# Patient Record
Sex: MALE | Race: WHITE | NOT HISPANIC OR LATINO | Employment: FULL TIME | URBAN - NONMETROPOLITAN AREA
[De-identification: names, ages, dates, MRNs, and addresses within clinical notes are randomized per-mention and may not be internally consistent; named-entity substitution may affect disease eponyms.]

---

## 2021-04-01 ENCOUNTER — HOSPITAL ENCOUNTER (OUTPATIENT)
Dept: GENERAL RADIOLOGY | Facility: HOSPITAL | Age: 41
Discharge: HOME | End: 2021-04-01
Admitting: NURSE PRACTITIONER

## 2021-04-01 ENCOUNTER — TRANSCRIBE ORDERS (OUTPATIENT)
Dept: LAB | Facility: HOSPITAL | Age: 41
End: 2021-04-01

## 2021-04-01 DIAGNOSIS — R31.9 HEMATURIA, UNSPECIFIED TYPE: ICD-10-CM

## 2021-04-01 DIAGNOSIS — R30.0 DYSURIA: ICD-10-CM

## 2021-04-01 DIAGNOSIS — R30.0 DYSURIA: Primary | ICD-10-CM

## 2021-04-01 PROCEDURE — 74019 RADEX ABDOMEN 2 VIEWS: CPT | Performed by: RADIOLOGY

## 2021-04-01 PROCEDURE — 74018 RADEX ABDOMEN 1 VIEW: CPT

## 2022-04-11 ENCOUNTER — HOSPITAL ENCOUNTER (OUTPATIENT)
Dept: GENERAL RADIOLOGY | Facility: HOSPITAL | Age: 42
Discharge: HOME | End: 2022-04-11
Admitting: NURSE PRACTITIONER

## 2022-04-11 ENCOUNTER — OFFICE VISIT (OUTPATIENT)
Dept: UROLOGY | Facility: CLINIC | Age: 42
End: 2022-04-11

## 2022-04-11 VITALS — WEIGHT: 185 LBS | HEIGHT: 66 IN | BODY MASS INDEX: 29.73 KG/M2

## 2022-04-11 DIAGNOSIS — K59.04 CHRONIC IDIOPATHIC CONSTIPATION: ICD-10-CM

## 2022-04-11 DIAGNOSIS — R10.9 BILATERAL FLANK PAIN: ICD-10-CM

## 2022-04-11 DIAGNOSIS — N20.1 LEFT URETERAL STONE: Primary | ICD-10-CM

## 2022-04-11 DIAGNOSIS — N20.0 BILATERAL NEPHROLITHIASIS: ICD-10-CM

## 2022-04-11 LAB
BILIRUB BLD-MCNC: ABNORMAL MG/DL
CLARITY, POC: CLEAR
COLOR UR: YELLOW
EXPIRATION DATE: ABNORMAL
GLUCOSE UR STRIP-MCNC: NEGATIVE MG/DL
KETONES UR QL: NEGATIVE
LEUKOCYTE EST, POC: NEGATIVE
Lab: ABNORMAL
NITRITE UR-MCNC: NEGATIVE MG/ML
PH UR: 5 [PH] (ref 5–8)
PROT UR STRIP-MCNC: ABNORMAL MG/DL
RBC # UR STRIP: ABNORMAL /UL
SP GR UR: 1.02 (ref 1–1.03)
UROBILINOGEN UR QL: NORMAL

## 2022-04-11 PROCEDURE — 74018 RADEX ABDOMEN 1 VIEW: CPT | Performed by: RADIOLOGY

## 2022-04-11 PROCEDURE — 74018 RADEX ABDOMEN 1 VIEW: CPT

## 2022-04-11 PROCEDURE — 99204 OFFICE O/P NEW MOD 45 MIN: CPT | Performed by: NURSE PRACTITIONER

## 2022-04-11 PROCEDURE — 87086 URINE CULTURE/COLONY COUNT: CPT | Performed by: NURSE PRACTITIONER

## 2022-04-11 PROCEDURE — 96372 THER/PROPH/DIAG INJ SC/IM: CPT | Performed by: NURSE PRACTITIONER

## 2022-04-11 RX ORDER — POLYETHYLENE GLYCOL 3350 17 G/17G
17 POWDER, FOR SOLUTION ORAL DAILY
Qty: 30 EACH | Refills: 3 | Status: SHIPPED | OUTPATIENT
Start: 2022-04-11

## 2022-04-11 RX ORDER — KETOROLAC TROMETHAMINE 30 MG/ML
60 INJECTION, SOLUTION INTRAMUSCULAR; INTRAVENOUS ONCE
Status: COMPLETED | OUTPATIENT
Start: 2022-04-11 | End: 2022-04-11

## 2022-04-11 RX ORDER — KETOROLAC TROMETHAMINE 10 MG/1
10 TABLET, FILM COATED ORAL 3 TIMES DAILY
COMMUNITY
Start: 2022-04-07 | End: 2022-04-12

## 2022-04-11 RX ORDER — TAMSULOSIN HYDROCHLORIDE 0.4 MG/1
1 CAPSULE ORAL DAILY
Qty: 30 CAPSULE | Refills: 1 | Status: SHIPPED | OUTPATIENT
Start: 2022-04-11

## 2022-04-11 RX ORDER — TAMSULOSIN HYDROCHLORIDE 0.4 MG/1
0.4 CAPSULE ORAL
COMMUNITY
Start: 2015-09-27 | End: 2022-04-20

## 2022-04-11 RX ORDER — HYDROCODONE BITARTRATE AND ACETAMINOPHEN 5; 325 MG/1; MG/1
1 TABLET ORAL EVERY 6 HOURS PRN
COMMUNITY
Start: 2022-04-06

## 2022-04-11 RX ORDER — KETOROLAC TROMETHAMINE 10 MG/1
10 TABLET, FILM COATED ORAL EVERY 6 HOURS PRN
Qty: 16 TABLET | Refills: 0 | Status: SHIPPED | OUTPATIENT
Start: 2022-04-11

## 2022-04-11 RX ORDER — GENTAMICIN SULFATE 80 MG/100ML
80 INJECTION, SOLUTION INTRAVENOUS ONCE
Status: CANCELLED | OUTPATIENT
Start: 2022-04-11

## 2022-04-11 RX ORDER — ONDANSETRON 4 MG/1
4 TABLET, FILM COATED ORAL EVERY 12 HOURS PRN
Qty: 20 TABLET | Refills: 0 | Status: SHIPPED | OUTPATIENT
Start: 2022-04-11

## 2022-04-11 RX ADMIN — KETOROLAC TROMETHAMINE 60 MG: 30 INJECTION, SOLUTION INTRAMUSCULAR; INTRAVENOUS at 10:21

## 2022-04-11 NOTE — H&P (VIEW-ONLY)
Chief Complaint  LEFT PROXIMAL URETERAL CALCULUS/FLANK PAIN (NEW PATIENT WITH LEFT PROXIMAL URETERAL STONE/FLANK PAIN ER FOLLOW UP)    Franc Mendoza presents to [facility] GASTROENTEROLOGY [facility] UROLOGY  History of Present Illness    MR. KISHOR MENDOZA IS A Very pleasant 41 YEAR OLD MALE patient with a significant history of recurrent kidney stones, WHO presents to clinic today for evaluation. He is an ER follow-up for kidney stones.  He e was last seen in clinic in 2016 by Dr. Kuhn, he reports passing numerous stones all the time.    Patient states he presented to [facility] in [place]  ED morning 04/06/2022 secondary to very sharp 10/10, aching and very consistent left flank pain radiating to his LLQ of his abdomen and suprapubic region.  He states he had significant testalgia, pelvic pain and suprapubic discomfort, with urinary symptoms of frequency, urgency, and dysuria.    He had a CT abdomen which showed an obstructing 5mm calculus in the left proximal ureter, causing moderate left sided hydronephrosis and hydroureter.  There were no other stones or hydronephrosis in bilateral kidneys, or right distal ureter.    On presentation to clinic, he reports significant discomfort 8/10.  He reports left flank pain that is still very colicky. He has left lower quadrant pain, significant pressure/discomfort still in his leftt lower abdomen and pelvic region. He still has nausea, no vomiting, denies chills or fevers. He denies having any more urinary symptoms of frequency, urgency, and dysuria.  He denies any burning on urination, his IPSS score is 13.    We both reviewed/discussed his repeat KUB today which showed that    We both reviewed/discussed her KUB results today which are remarkable for LEFT 5MM Obstructing proximal ureteral calcifications, and moderate to large stool burden.  THIS IS characterized by extreme amounts of constipation.  We spoke about the impact of this  "on bladder function.  We spoke about  its relationship to recurrent urinary tract infections, bladder pain, abdominal pain, and flank pain she is experiencing..        Objective   Vital Signs:   Ht 167.6 cm (66\")   Wt 83.9 kg (185 lb)   BMI 29.86 kg/m²     BMI has not been calculated during today's encounter.       Physical Exam  Constitutional:       General: He is in acute distress.      Appearance: He is well-developed.   HENT:      Head: Normocephalic and atraumatic.      Right Ear: External ear normal.      Left Ear: External ear normal.   Eyes:      General:         Right eye: No discharge.         Left eye: No discharge.      Conjunctiva/sclera: Conjunctivae normal.      Pupils: Pupils are equal, round, and reactive to light.   Neck:      Thyroid: No thyromegaly.      Trachea: No tracheal deviation.   Cardiovascular:      Rate and Rhythm: Normal rate and regular rhythm.      Heart sounds: No murmur heard.    No friction rub.   Pulmonary:      Effort: Pulmonary effort is normal. No respiratory distress.      Breath sounds: Normal breath sounds. No stridor.   Abdominal:      General: Bowel sounds are normal. There is distension.      Palpations: Abdomen is soft.      Tenderness: There is abdominal tenderness. There is left CVA tenderness and guarding.   Genitourinary:     Penis: Normal and uncircumcised. No tenderness or discharge.       Testes: Normal.      Rectum: Normal. Guaiac result negative.   Musculoskeletal:         General: Tenderness present. No deformity. Normal range of motion.      Cervical back: Normal range of motion and neck supple.   Skin:     General: Skin is warm and dry.      Capillary Refill: Capillary refill takes less than 2 seconds.      Coloration: Skin is pale.   Neurological:      Mental Status: He is alert and oriented to person, place, and time.      Cranial Nerves: No cranial nerve deficit.      Motor: Weakness present.      Coordination: Coordination normal.   Psychiatric:       "   Behavior: Behavior normal.         Thought Content: Thought content normal.         Judgment: Judgment normal.     IPSS Questionnaire (AUA-7):  Over the past month…    1)  How often have you had a sensation of not emptying your bladder completely after you finish urinating?  2 - Less than half the time   2)  How often have you had to urinate again less than two hours after you finished urinating? 3 - About half the time   3)  How often have you found you stopped and started again several times when you urinated?  1 - Less than 1 time in 5   4) How difficult have you found it to postpone urination?  3 - About half the time   5) How often have you had a weak urinary stream?  1 - Less than 1 time in 5   6) How often have you had to push or strain to begin urination?  1 - Less than 1 time in 5   7) How many times did you most typically get up to urinate from the time you went to bed until the time you got up in the morning?  2 - 2 times   Total score:  0-7 mildly symptomatic    8-19 moderately symptomatic                                       13    20-35 severely symptomatic         Result Review :     UA    Urinalysis 4/11/22   Ketones, UA Negative   Leukocytes, UA Negative           Data reviewed: Radiologic studies Reviewed CT scan done at [facility] 04/06/2022, and KUB done today were remarkable for a 5 mm left ureteral calculus.          Assessment and Plan    Diagnoses and all orders for this visit:    1. Left ureteral stone (Primary)  -     COVID PRE-OP / PRE-PROCEDURE SCREENING ORDER (NO ISOLATION) - Swab, Nasopharynx; Future  -     Case Request; Standing  -     CBC (No Diff); Future  -     Basic Metabolic Panel; Future  -     Case Request  -     ketorolac (TORADOL) 10 MG tablet; Take 1 tablet by mouth Every 6 (Six) Hours As Needed for Moderate Pain .  Dispense: 16 tablet; Refill: 0  -     tamsulosin (FLOMAX) 0.4 MG capsule 24 hr capsule; Take 1 capsule by mouth Daily.  Dispense: 30 capsule; Refill:  1  -     ondansetron (Zofran) 4 MG tablet; Take 1 tablet by mouth Every 12 (Twelve) Hours As Needed for Nausea.  Dispense: 20 tablet; Refill: 0  -     POC Urinalysis Dipstick, Automated  -     Urine Culture - Urine, Urine, Clean Catch  -     ketorolac (TORADOL) injection 60 mg    2. Bilateral nephrolithiasis  -     XR Abdomen KUB  -     ketorolac (TORADOL) 10 MG tablet; Take 1 tablet by mouth Every 6 (Six) Hours As Needed for Moderate Pain .  Dispense: 16 tablet; Refill: 0  -     tamsulosin (FLOMAX) 0.4 MG capsule 24 hr capsule; Take 1 capsule by mouth Daily.  Dispense: 30 capsule; Refill: 1  -     ondansetron (Zofran) 4 MG tablet; Take 1 tablet by mouth Every 12 (Twelve) Hours As Needed for Nausea.  Dispense: 20 tablet; Refill: 0  -     POC Urinalysis Dipstick, Automated  -     Urine Culture - Urine, Urine, Clean Catch    3. Bilateral flank pain  -     XR Abdomen KUB  -     COVID PRE-OP / PRE-PROCEDURE SCREENING ORDER (NO ISOLATION) - Swab, Nasopharynx; Future  -     Case Request; Standing  -     CBC (No Diff); Future  -     Basic Metabolic Panel; Future  -     Case Request  -     ketorolac (TORADOL) 10 MG tablet; Take 1 tablet by mouth Every 6 (Six) Hours As Needed for Moderate Pain .  Dispense: 16 tablet; Refill: 0  -     tamsulosin (FLOMAX) 0.4 MG capsule 24 hr capsule; Take 1 capsule by mouth Daily.  Dispense: 30 capsule; Refill: 1  -     ondansetron (Zofran) 4 MG tablet; Take 1 tablet by mouth Every 12 (Twelve) Hours As Needed for Nausea.  Dispense: 20 tablet; Refill: 0  -     POC Urinalysis Dipstick, Automated  -     Urine Culture - Urine, Urine, Clean Catch  -     ketorolac (TORADOL) injection 60 mg    4. Chronic idiopathic constipation  -     XR Abdomen KUB  -     ketorolac (TORADOL) 10 MG tablet; Take 1 tablet by mouth Every 6 (Six) Hours As Needed for Moderate Pain .  Dispense: 16 tablet; Refill: 0  -     tamsulosin (FLOMAX) 0.4 MG capsule 24 hr capsule; Take 1 capsule by mouth Daily.  Dispense: 30  capsule; Refill: 1  -     ondansetron (Zofran) 4 MG tablet; Take 1 tablet by mouth Every 12 (Twelve) Hours As Needed for Nausea.  Dispense: 20 tablet; Refill: 0  -     magnesium citrate solution; Take 296 mL by mouth 1 (One) Time for 1 dose. MAY REPEAT IN 0NE WEEK IF NOT EFFECTIVE  Dispense: 296 mL; Refill: 3  -     polyethylene glycol (MiraLax) 17 g packet; Take 17 g by mouth Daily.  Dispense: 30 each; Refill: 3    Other orders  -     Follow Anesthesia Guidelines / Protocol; Future  -     Obtain Informed Consent; Future  -     Provide NPO Instructions to Patient; Future  -     Chlorhexidine Skin Prep; Future       ASSESSMENT   Left Ureteral Calculus/FLANK PAIN/DYSURIA/IBS-CONSTIPATION  Mr. Alvarez Mendoza is a pleasant 41-year-old male with a significant history of recurrent kidney stones who presents to clinic today for evaluation.  He is an ER follow-up, with CT scan showing a left proximal 5 mm ureteral stone, and a significant moderate stool burden in his colon consistent with constipation.    The Patient has been diagnosed with a 5MM Left ureteral calculus.  He presents to clinic today with left flank pain that is colicky in nature, very persistent 7/10 and has become very bothersome to him.  He has had  ER visits in the last week, he is now desirous of surgical intervention at this point.    We have discussed the various parameters regarding spontaneous passage including the notion that a tiny 1 -3 mm stone like he has, has a 95% high likelihood of spontaneous passage versus a larger stone OF > 5 MM being caught up in the upper areas of the urinary tract. We also discussed the medical management of stone disease and the use of medical expulsive therapy in the form of Flomax. This is used in an off label setting. I also talked about nonoperative management including ambulation and increasing fluids and hot tub as being an effective adjuncts in the treatment of a ureteral stone.     We discussed the indicators for  intervention including  absolute indicators such as sepsis and uncontrollable severe pain as well as  the relative indicators of moderate pain that is well-controlled with various analgesia.     IBS- Patient has significant irritable bowel syndrome, characterized by extreme amounts of constipation.  We spoke about the impact of this on bladder function.  We spoke about  its relationship to recurrent urinary tract infections.We discussed the need for increasing p.o. fluid intake to at least 2 to 3 L of water daily, and discussed the physiology of colonic motility as well as use of MiraLAX as a bulk laxative versus the newer class of serotonin uptake blockers such as Linzess.  We stressed the need for a daily bowel movement and discussed the [place] stool scale at length. We also discussed a referral to the GI nurse practitioner        PLAN    I Discussed this case with Dr ALCALA, who also reviewed his imaging and he is agreeable this plan of care.      Patient has been scheduled for Left Ureteroscopy laser lithotripsy with possible left stent on Thursday,April 14, 2022 with Dr. ALCALA    Toradol 60 mg IM x1 dose given in clinic today-severe left flank pain    Patient have adequate Narcotic pain medication hydrocodone given in ER.    Encourage bowel regimen ASAP-severe constipation/pain    He has been given nonnarcotic pain medication-Toradol and advised to continue with Toradol for breakthrough pain as needed.  Also nausea medicine, Flomax for medical expulsive therapy     We discussed other treatment options for her flank pain pending surgery, with patient encouraged to continue conservative therapy alternating NSAID/Tylenol as tolerated,and a strict Bowel regiment,  Also including hot baths, showers, warm compresses to lower back as tolerated. Also encouraged walking, physical therapy, light exercises as tolerated    Rest is the most important treatment in the case of flank pain. Rest and physical therapy are  enough to improve minor pain. Discussed to monitor her daily routine with prevention of flank pain by: At least Drinking 8 glasses of water per day, Limiting your alcohol consumption.  Having a healthy diet containing fruits, vegetables, and a lot of fluids, Practicing safe sex.  Also maintaining proper hygiene of your body as well as the environment.    We will follow-up with patient postprocedure,     HE may return sooner if need be    Patient is agreeable to plan of care    Follow Up   Return in about 3 days (around 4/14/2022) for Next scheduled follow up, DR ALCALA, IN OR FOR CYSTOSCOPY URESTEROSCOPY LASER LITHOTRIPSY.  Patient was given instructions and counseling regarding his condition or for health maintenance advice. Please see specific information pulled into the AVS if appropriate.

## 2022-04-11 NOTE — PROGRESS NOTES
Chief Complaint  LEFT PROXIMAL URETERAL CALCULUS/FLANK PAIN (NEW PATIENT WITH LEFT PROXIMAL URETERAL STONE/FLANK PAIN ER FOLLOW UP)    Franc Mendoza presents to [facility]  History of Present Illness    MR. KISHOR MENDOZA IS A Very pleasant 41 YEAR OLD MALE patient with a significant history of recurrent kidney stones, WHO presents to clinic today for evaluation. He is an ER follow-up for kidney stones.  He e was last seen in clinic in 2016 by Dr. Kuhn, he reports passing numerous stones all the time.    Patient states he presented to [facility] in [place]  ED morning 04/06/2022 secondary to very sharp 10/10, aching and very consistent left flank pain radiating to his LLQ of his abdomen and suprapubic region.  He states he had significant testalgia, pelvic pain and suprapubic discomfort, with urinary symptoms of frequency, urgency, and dysuria.    He had a CT abdomen which showed an obstructing 5mm calculus in the left proximal ureter, causing moderate left sided hydronephrosis and hydroureter.  There were no other stones or hydronephrosis in bilateral kidneys, or right distal ureter.    On presentation to clinic, he reports significant discomfort 8/10.  He reports left flank pain that is still very colicky. He has left lower quadrant pain, significant pressure/discomfort still in his leftt lower abdomen and pelvic region. He still has nausea, no vomiting, denies chills or fevers. He denies having any more urinary symptoms of frequency, urgency, and dysuria.  He denies any burning on urination, his IPSS score is 13.    We both reviewed/discussed his repeat KUB today which showed that    We both reviewed/discussed her KUB results today which are remarkable for LEFT 5MM Obstructing proximal ureteral calcifications, and moderate to large stool burden.  THIS IS characterized by extreme amounts of constipation.  We spoke about the impact of this  "on bladder function.  We spoke about  its relationship to recurrent urinary tract infections, bladder pain, abdominal pain, and flank pain she is experiencing..        Objective   Vital Signs:   Ht 167.6 cm (66\")   Wt 83.9 kg (185 lb)   BMI 29.86 kg/m²     BMI has not been calculated during today's encounter.       Physical Exam  Constitutional:       General: He is in acute distress.      Appearance: He is well-developed.   HENT:      Head: Normocephalic and atraumatic.      Right Ear: External ear normal.      Left Ear: External ear normal.   Eyes:      General:         Right eye: No discharge.         Left eye: No discharge.      Conjunctiva/sclera: Conjunctivae normal.      Pupils: Pupils are equal, round, and reactive to light.   Neck:      Thyroid: No thyromegaly.      Trachea: No tracheal deviation.   Cardiovascular:      Rate and Rhythm: Normal rate and regular rhythm.      Heart sounds: No murmur heard.    No friction rub.   Pulmonary:      Effort: Pulmonary effort is normal. No respiratory distress.      Breath sounds: Normal breath sounds. No stridor.   Abdominal:      General: Bowel sounds are normal. There is distension.      Palpations: Abdomen is soft.      Tenderness: There is abdominal tenderness. There is left CVA tenderness and guarding.   Genitourinary:     Penis: Normal and uncircumcised. No tenderness or discharge.       Testes: Normal.      Rectum: Normal. Guaiac result negative.   Musculoskeletal:         General: Tenderness present. No deformity. Normal range of motion.      Cervical back: Normal range of motion and neck supple.   Skin:     General: Skin is warm and dry.      Capillary Refill: Capillary refill takes less than 2 seconds.      Coloration: Skin is pale.   Neurological:      Mental Status: He is alert and oriented to person, place, and time.      Cranial Nerves: No cranial nerve deficit.      Motor: Weakness present.      Coordination: Coordination normal.   Psychiatric:       "   Behavior: Behavior normal.         Thought Content: Thought content normal.         Judgment: Judgment normal.     IPSS Questionnaire (AUA-7):  Over the past month…    1)  How often have you had a sensation of not emptying your bladder completely after you finish urinating?  2 - Less than half the time   2)  How often have you had to urinate again less than two hours after you finished urinating? 3 - About half the time   3)  How often have you found you stopped and started again several times when you urinated?  1 - Less than 1 time in 5   4) How difficult have you found it to postpone urination?  3 - About half the time   5) How often have you had a weak urinary stream?  1 - Less than 1 time in 5   6) How often have you had to push or strain to begin urination?  1 - Less than 1 time in 5   7) How many times did you most typically get up to urinate from the time you went to bed until the time you got up in the morning?  2 - 2 times   Total score:  0-7 mildly symptomatic    8-19 moderately symptomatic                                       13    20-35 severely symptomatic         Result Review :     UA    Urinalysis 4/11/22   Ketones, UA Negative   Leukocytes, UA Negative           Data reviewed: Radiologic studies Reviewed CT scan done at [facility] 04/06/2022, and KUB done today were remarkable for a 5 mm left ureteral calculus.          Assessment and Plan    Diagnoses and all orders for this visit:    1. Left ureteral stone (Primary)  -     COVID PRE-OP / PRE-PROCEDURE SCREENING ORDER (NO ISOLATION) - Swab, Nasopharynx; Future  -     Case Request; Standing  -     CBC (No Diff); Future  -     Basic Metabolic Panel; Future  -     Case Request  -     ketorolac (TORADOL) 10 MG tablet; Take 1 tablet by mouth Every 6 (Six) Hours As Needed for Moderate Pain .  Dispense: 16 tablet; Refill: 0  -     tamsulosin (FLOMAX) 0.4 MG capsule 24 hr capsule; Take 1 capsule by mouth Daily.  Dispense: 30 capsule; Refill:  1  -     ondansetron (Zofran) 4 MG tablet; Take 1 tablet by mouth Every 12 (Twelve) Hours As Needed for Nausea.  Dispense: 20 tablet; Refill: 0  -     POC Urinalysis Dipstick, Automated  -     Urine Culture - Urine, Urine, Clean Catch  -     ketorolac (TORADOL) injection 60 mg    2. Bilateral nephrolithiasis  -     XR Abdomen KUB  -     ketorolac (TORADOL) 10 MG tablet; Take 1 tablet by mouth Every 6 (Six) Hours As Needed for Moderate Pain .  Dispense: 16 tablet; Refill: 0  -     tamsulosin (FLOMAX) 0.4 MG capsule 24 hr capsule; Take 1 capsule by mouth Daily.  Dispense: 30 capsule; Refill: 1  -     ondansetron (Zofran) 4 MG tablet; Take 1 tablet by mouth Every 12 (Twelve) Hours As Needed for Nausea.  Dispense: 20 tablet; Refill: 0  -     POC Urinalysis Dipstick, Automated  -     Urine Culture - Urine, Urine, Clean Catch    3. Bilateral flank pain  -     XR Abdomen KUB  -     COVID PRE-OP / PRE-PROCEDURE SCREENING ORDER (NO ISOLATION) - Swab, Nasopharynx; Future  -     Case Request; Standing  -     CBC (No Diff); Future  -     Basic Metabolic Panel; Future  -     Case Request  -     ketorolac (TORADOL) 10 MG tablet; Take 1 tablet by mouth Every 6 (Six) Hours As Needed for Moderate Pain .  Dispense: 16 tablet; Refill: 0  -     tamsulosin (FLOMAX) 0.4 MG capsule 24 hr capsule; Take 1 capsule by mouth Daily.  Dispense: 30 capsule; Refill: 1  -     ondansetron (Zofran) 4 MG tablet; Take 1 tablet by mouth Every 12 (Twelve) Hours As Needed for Nausea.  Dispense: 20 tablet; Refill: 0  -     POC Urinalysis Dipstick, Automated  -     Urine Culture - Urine, Urine, Clean Catch  -     ketorolac (TORADOL) injection 60 mg    4. Chronic idiopathic constipation  -     XR Abdomen KUB  -     ketorolac (TORADOL) 10 MG tablet; Take 1 tablet by mouth Every 6 (Six) Hours As Needed for Moderate Pain .  Dispense: 16 tablet; Refill: 0  -     tamsulosin (FLOMAX) 0.4 MG capsule 24 hr capsule; Take 1 capsule by mouth Daily.  Dispense: 30  capsule; Refill: 1  -     ondansetron (Zofran) 4 MG tablet; Take 1 tablet by mouth Every 12 (Twelve) Hours As Needed for Nausea.  Dispense: 20 tablet; Refill: 0  -     magnesium citrate solution; Take 296 mL by mouth 1 (One) Time for 1 dose. MAY REPEAT IN 0NE WEEK IF NOT EFFECTIVE  Dispense: 296 mL; Refill: 3  -     polyethylene glycol (MiraLax) 17 g packet; Take 17 g by mouth Daily.  Dispense: 30 each; Refill: 3    Other orders  -     Follow Anesthesia Guidelines / Protocol; Future  -     Obtain Informed Consent; Future  -     Provide NPO Instructions to Patient; Future  -     Chlorhexidine Skin Prep; Future       ASSESSMENT   Left Ureteral Calculus/FLANK PAIN/DYSURIA/IBS-CONSTIPATION  Mr. Alvarez Mendoza is a pleasant 41-year-old male with a significant history of recurrent kidney stones who presents to clinic today for evaluation.  He is an ER follow-up, with CT scan showing a left proximal 5 mm ureteral stone, and a significant moderate stool burden in his colon consistent with constipation.    The Patient has been diagnosed with a 5MM Left ureteral calculus.  He presents to clinic today with left flank pain that is colicky in nature, very persistent 7/10 and has become very bothersome to him.  He has had  ER visits in the last week, he is now desirous of surgical intervention at this point.    We have discussed the various parameters regarding spontaneous passage including the notion that a tiny 1 -3 mm stone like he has, has a 95% high likelihood of spontaneous passage versus a larger stone OF > 5 MM being caught up in the upper areas of the urinary tract. We also discussed the medical management of stone disease and the use of medical expulsive therapy in the form of Flomax. This is used in an off label setting. I also talked about nonoperative management including ambulation and increasing fluids and hot tub as being an effective adjuncts in the treatment of a ureteral stone.     We discussed the indicators for  intervention including  absolute indicators such as sepsis and uncontrollable severe pain as well as  the relative indicators of moderate pain that is well-controlled with various analgesia.     IBS- Patient has significant irritable bowel syndrome, characterized by extreme amounts of constipation.  We spoke about the impact of this on bladder function.  We spoke about  its relationship to recurrent urinary tract infections.We discussed the need for increasing p.o. fluid intake to at least 2 to 3 L of water daily, and discussed the physiology of colonic motility as well as use of MiraLAX as a bulk laxative versus the newer class of serotonin uptake blockers such as Linzess.  We stressed the need for a daily bowel movement and discussed the [place] stool scale at length. We also discussed a referral to the GI nurse practitioner        PLAN    I Discussed this case with Dr ALCALA, who also reviewed his imaging and he is agreeable this plan of care.      Patient has been scheduled for Left Ureteroscopy laser lithotripsy with possible left stent on Thursday,April 14, 2022 with Dr. ALCALA    Toradol 60 mg IM x1 dose given in clinic today-severe left flank pain    Patient have adequate Narcotic pain medication hydrocodone given in ER.    Encourage bowel regimen ASAP-severe constipation/pain    He has been given nonnarcotic pain medication-Toradol and advised to continue with Toradol for breakthrough pain as needed.  Also nausea medicine, Flomax for medical expulsive therapy     We discussed other treatment options for her flank pain pending surgery, with patient encouraged to continue conservative therapy alternating NSAID/Tylenol as tolerated,and a strict Bowel regiment,  Also including hot baths, showers, warm compresses to lower back as tolerated. Also encouraged walking, physical therapy, light exercises as tolerated    Rest is the most important treatment in the case of flank pain. Rest and physical therapy are  enough to improve minor pain. Discussed to monitor her daily routine with prevention of flank pain by: At least Drinking 8 glasses of water per day, Limiting your alcohol consumption.  Having a healthy diet containing fruits, vegetables, and a lot of fluids, Practicing safe sex.  Also maintaining proper hygiene of your body as well as the environment.    We will follow-up with patient postprocedure,     HE may return sooner if need be    Patient is agreeable to plan of care    Follow Up   Return in about 3 days (around 4/14/2022) for Next scheduled follow up, DR ALCALA, IN OR FOR CYSTOSCOPY URESTEROSCOPY LASER LITHOTRIPSY.  Patient was given instructions and counseling regarding his condition or for health maintenance advice. Please see specific information pulled into the AVS if appropriate.

## 2022-04-11 NOTE — DISCHARGE INSTRUCTIONS

## 2022-04-11 NOTE — PATIENT INSTRUCTIONS
"Textbook of Natural Medicine (5th ed., pp. 0183-8922.e3). [place], [place]: [employer].\">   Dietary Guidelines to Help Prevent Kidney Stones  Kidney stones are deposits of minerals and salts that form inside your kidneys. Your risk of developing kidney stones may be greater depending on your diet, your lifestyle, the medicines you take, and whether you have certain medical conditions. Most people can lower their chances of developing kidney stones by following the instructions below. Your dietitian may give you more specific instructions depending on your overall health and the type of kidney stones you tend to develop.  What are tips for following this plan?  Reading food labels    Choose foods with \"no salt added\" or \"low-salt\" labels. Limit your salt (sodium) intake to less than 1,500 mg a day.  Choose foods with calcium for each meal and snack. Try to eat about 300 mg of calcium at each meal. Foods that contain 200-500 mg of calcium a serving include:  8 oz (237 mL) of milk, calcium-fortifiednon-dairy milk, and calcium-fortifiedfruit juice. Calcium-fortified means that calcium has been added to these drinks.  8 oz (237 mL) of kefir, yogurt, and soy yogurt.  4 oz (114 g) of tofu.  1 oz (28 g) of cheese.  1 cup (150 g) of dried figs.  1 cup (91 g) of cooked broccoli.  One 3 oz (85 g) can of sardines or mackerel.  Most people need 1,000-1,500 mg of calcium a day. Talk to your dietitian about how much calcium is recommended for you.  Shopping  Buy plenty of fresh fruits and vegetables. Most people do not need to avoid fruits and vegetables, even if these foods contain nutrients that may contribute to kidney stones.  When shopping for convenience foods, choose:  Whole pieces of fruit.  Pre-made salads with dressing on the side.  Low-fat fruit and yogurt smoothies.  Avoid buying frozen meals or prepared deli foods. These can be high in sodium.  Look for foods with live cultures, such as yogurt and kefir.  Choose high-fiber " grains, such as whole-wheat breads, oat bran, and wheat cereals.  Cooking  Do not add salt to food when cooking. Place a salt shaker on the table and allow each person to add his or her own salt to taste.  Use vegetable protein, such as beans, textured vegetable protein (TVP), or tofu, instead of meat in pasta, casseroles, and soups.  Meal planning  Eat less salt, if told by your dietitian. To do this:  Avoid eating processed or pre-made food.  Avoid eating fast food.  Eat less animal protein, including cheese, meat, poultry, or fish, if told by your dietitian. To do this:  Limit the number of times you have meat, poultry, fish, or cheese each week. Eat a diet free of meat at least 2 days a week.  Eat only one serving each day of meat, poultry, fish, or seafood.  When you prepare animal protein, cut pieces into small portion sizes. For most meat and fish, one serving is about the size of the palm of your hand.  Eat at least five servings of fresh fruits and vegetables each day. To do this:  Keep fruits and vegetables on hand for snacks.  Eat one piece of fruit or a handful of berries with breakfast.  Have a salad and fruit at lunch.  Have two kinds of vegetables at dinner.  Limit foods that are high in a substance called oxalate. These include:  Spinach (cooked), rhubarb, beets, sweet potatoes, and Swiss chard.  Peanuts.  Potato chips, french fries, and baked potatoes with skin on.  Nuts and nut products.  Chocolate.  If you regularly take a diuretic medicine, make sure to eat at least 1 or 2 servings of fruits or vegetables that are high in potassium each day. These include:  Avocado.  Banana.  Orange, prune, carrot, or tomato juice.  Baked potato.  Cabbage.  Beans and split peas.  Lifestyle    Drink enough fluid to keep your urine pale yellow. This is the most important thing you can do. Spread your fluid intake throughout the day.  If you drink alcohol:  Limit how much you use to:  0-1 drink a day for women who  are not pregnant.  0-2 drinks a day for men.  Be aware of how much alcohol is in your drink. In the [place] one drink equals one 12 oz bottle of beer (355 mL), one 5 oz glass of wine (148 mL), or one 1½ oz glass of hard liquor (44 mL).  Lose weight if told by your health care provider. Work with your dietitian to find an eating plan and weight loss strategies that work best for you.    General information  Talk to your health care provider and dietitian about taking daily supplements. You may be told the following depending on your health and the cause of your kidney stones:  Not to take supplements with vitamin C.  To take a calcium supplement.  To take a daily probiotic supplement.  To take other supplements such as magnesium, fish oil, or vitamin B6.  Take over-the-counter and prescription medicines only as told by your health care provider. These include supplements.  What foods should I limit?  Limit your intake of the following foods, or eat them as told by your dietitian.  Vegetables  Spinach. Rhubarb. Beets. Canned vegetables. Pickles. Olives. Baked potatoes with skin.  Grains  Wheat bran. Baked goods. Salted crackers. Cereals high in sugar.  Meats and other proteins  Nuts. Nut butters. Large portions of meat, poultry, or fish. Salted, precooked, or cured meats, such as sausages, meat loaves, and hot dogs.  Dairy  Cheese.  Beverages  Regular soft drinks. Regular vegetable juice.  Seasonings and condiments  Seasoning blends with salt. Salad dressings. Soy sauce. Ketchup. Barbecue sauce.  Other foods  Canned soups. Canned pasta sauce. Casseroles. Pizza. Lasagna. Frozen meals. Potato chips. French fries.  The items listed above may not be a complete list of foods and beverages you should limit. Contact a dietitian for more information.  What foods should I avoid?  Talk to your dietitian about specific foods you should avoid based on the type of kidney stones you have and your overall  health.  Fruits  Grapefruit.  The item listed above may not be a complete list of foods and beverages you should avoid. Contact a dietitian for more information.  Summary  Kidney stones are deposits of minerals and salts that form inside your kidneys.  You can lower your risk of kidney stones by making changes to your diet.  The most important thing you can do is drink enough fluid. Drink enough fluid to keep your urine pale yellow.  Talk to your dietitian about how much calcium you should have each day, and eat less salt and animal protein as told by your dietitian.  This information is not intended to replace advice given to you by your health care provider. Make sure you discuss any questions you have with your health care provider.  Document Revised: 12/10/2020 Document Reviewed: 12/10/2020  [employer] Patient Education © 2021 [employer]  Low-Purine Eating Plan  A low-purine eating plan involves making food choices to limit your intake of purine. Purine is a kind of uric acid. Too much uric acid in your blood can cause certain conditions, such as gout and kidney stones. Eating a low-purine diet can help control these conditions.  What are tips for following this plan?  Reading food labels  Avoid foods with saturated or Trans fat.  Check the ingredient list of grains-based foods, such as bread and cereal, to make sure that they contain whole grains.  Check the ingredient list of sauces or soups to make sure they do not contain meat or fish.  When choosing soft drinks, check the ingredient list to make sure they do not contain high-fructose corn syrup.  Shopping    Buy plenty of fresh fruits and vegetables.  Avoid buying canned or fresh fish.  Buy dairy products labeled as low-fat or nonfat.  Avoid buying premade or processed foods. These foods are often high in fat, salt (sodium), and added sugar.    Cooking  Use olive oil instead of butter when cooking. Oils like olive oil, canola oil, and sunflower oil contain  healthy fats.  Meal planning  Learn which foods do or do not affect you. If you find out that a food tends to cause your gout symptoms to flare up, avoid eating that food. You can enjoy foods that do not cause problems. If you have any questions about a food item, talk with your dietitian or health care provider.  Limit foods high in fat, especially saturated fat. Fat makes it harder for your body to get rid of uric acid.  Choose foods that are lower in fat and are lean sources of protein.  General guidelines  Limit alcohol intake to no more than 1 drink a day for nonpregnant women and 2 drinks a day for men. One drink equals 12 oz of beer, 5 oz of wine, or 1½ oz of hard liquor. Alcohol can affect the way your body gets rid of uric acid.  Drink plenty of water to keep your urine clear or pale yellow. Fluids can help remove uric acid from your body.  If directed by your health care provider, take a vitamin C supplement.  Work with your health care provider and dietitian to develop a plan to achieve or maintain a healthy weight. Losing weight can help reduce uric acid in your blood.  What foods are recommended?  The items listed may not be a complete list. Talk with your dietitian about what dietary choices are best for you.  Foods low in purines  Foods low in purines do not need to be limited. These include:  All fruits.  All low-purine vegetables, pickles, and olives.  Breads, pasta, rice, cornbread, and popcorn. Cake and other baked goods.  All dairy foods.  Eggs, nuts, and nut butters.  Spices and condiments, such as salt, herbs, and vinegar.  Plant oils, butter, and margarine.  Water, sugar-free soft drinks, tea, coffee, and cocoa.  Vegetable-based soups, broths, sauces, and gravies.  Foods moderate in purines  Foods moderate in purines should be limited to the amounts listed.  ½ cup of asparagus, cauliflower, spinach, mushrooms, or green peas, each day.  2/3 cup uncooked oatmeal, each day.  ¼ cup dry wheat bran  "or wheat germ, each day.  2-3 ounces of meat or poultry, each day.  4-6 ounces of shellfish, such as crab, lobster, oysters, or shrimp, each day.  1 cup cooked beans, peas, or lentils, each day.  Soup, broths, or bouillon made from meat or fish. Limit these foods as much as possible.  What foods are not recommended?  The items listed may not be a complete list. Talk with your dietitian about what dietary choices are best for you.  Limit your intake of foods high in purines, including:  Beer and other alcohol.  Meat-based gravy or sauce.  Canned or fresh fish, such as:  Anchovies, sardines, herring, and tuna.  Mussels and scallops.  Codfish, trout, and jocelyn.  Foster.  Organ meats, such as:  Liver or kidney.  Tripe.  Sweetbreads (thymus gland or pancreas).  Wild game or goose.  Yeast or yeast extract supplements.  Drinks sweetened with high-fructose corn syrup.  Summary  Eating a low-purine diet can help control conditions caused by too much uric acid in the body, such as gout or kidney stones.  Choose low-purine foods, limit alcohol, and limit foods high in fat.  You will learn over time which foods do or do not affect you. If you find out that a food tends to cause your gout symptoms to flare up, avoid eating that food.  This information is not intended to replace advice given to you by your health care provider. Make sure you discuss any questions you have with your health care provider.  Document Revised: 04/01/2021 Document Reviewed: 04/01/2021  [employer] Patient Education © 2021 [employer]  Textbook of Natural Medicine (5th ed., pp. 1769-1203.e3). [place], [place]: [employer].\">   Dietary Guidelines to Help Prevent Kidney Stones  Kidney stones are deposits of minerals and salts that form inside your kidneys. Your risk of developing kidney stones may be greater depending on your diet, your lifestyle, the medicines you take, and whether you have certain medical conditions. Most people can lower their chances of " "developing kidney stones by following the instructions below. Your dietitian may give you more specific instructions depending on your overall health and the type of kidney stones you tend to develop.  What are tips for following this plan?  Reading food labels    Choose foods with \"no salt added\" or \"low-salt\" labels. Limit your salt (sodium) intake to less than 1,500 mg a day.  Choose foods with calcium for each meal and snack. Try to eat about 300 mg of calcium at each meal. Foods that contain 200-500 mg of calcium a serving include:  8 oz (237 mL) of milk, calcium-fortifiednon-dairy milk, and calcium-fortifiedfruit juice. Calcium-fortified means that calcium has been added to these drinks.  8 oz (237 mL) of kefir, yogurt, and soy yogurt.  4 oz (114 g) of tofu.  1 oz (28 g) of cheese.  1 cup (150 g) of dried figs.  1 cup (91 g) of cooked broccoli.  One 3 oz (85 g) can of sardines or mackerel.  Most people need 1,000-1,500 mg of calcium a day. Talk to your dietitian about how much calcium is recommended for you.  Shopping  Buy plenty of fresh fruits and vegetables. Most people do not need to avoid fruits and vegetables, even if these foods contain nutrients that may contribute to kidney stones.  When shopping for convenience foods, choose:  Whole pieces of fruit.  Pre-made salads with dressing on the side.  Low-fat fruit and yogurt smoothies.  Avoid buying frozen meals or prepared deli foods. These can be high in sodium.  Look for foods with live cultures, such as yogurt and kefir.  Choose high-fiber grains, such as whole-wheat breads, oat bran, and wheat cereals.  Cooking  Do not add salt to food when cooking. Place a salt shaker on the table and allow each person to add his or her own salt to taste.  Use vegetable protein, such as beans, textured vegetable protein (TVP), or tofu, instead of meat in pasta, casseroles, and soups.  Meal planning  Eat less salt, if told by your dietitian. To do this:  Avoid eating " processed or pre-made food.  Avoid eating fast food.  Eat less animal protein, including cheese, meat, poultry, or fish, if told by your dietitian. To do this:  Limit the number of times you have meat, poultry, fish, or cheese each week. Eat a diet free of meat at least 2 days a week.  Eat only one serving each day of meat, poultry, fish, or seafood.  When you prepare animal protein, cut pieces into small portion sizes. For most meat and fish, one serving is about the size of the palm of your hand.  Eat at least five servings of fresh fruits and vegetables each day. To do this:  Keep fruits and vegetables on hand for snacks.  Eat one piece of fruit or a handful of berries with breakfast.  Have a salad and fruit at lunch.  Have two kinds of vegetables at dinner.  Limit foods that are high in a substance called oxalate. These include:  Spinach (cooked), rhubarb, beets, sweet potatoes, and Swiss chard.  Peanuts.  Potato chips, french fries, and baked potatoes with skin on.  Nuts and nut products.  Chocolate.  If you regularly take a diuretic medicine, make sure to eat at least 1 or 2 servings of fruits or vegetables that are high in potassium each day. These include:  Avocado.  Banana.  Orange, prune, carrot, or tomato juice.  Baked potato.  Cabbage.  Beans and split peas.  Lifestyle    Drink enough fluid to keep your urine pale yellow. This is the most important thing you can do. Spread your fluid intake throughout the day.  If you drink alcohol:  Limit how much you use to:  0-1 drink a day for women who are not pregnant.  0-2 drinks a day for men.  Be aware of how much alcohol is in your drink. In the [place] one drink equals one 12 oz bottle of beer (355 mL), one 5 oz glass of wine (148 mL), or one 1½ oz glass of hard liquor (44 mL).  Lose weight if told by your health care provider. Work with your dietitian to find an eating plan and weight loss strategies that work best for you.    General information  Talk to your  health care provider and dietitian about taking daily supplements. You may be told the following depending on your health and the cause of your kidney stones:  Not to take supplements with vitamin C.  To take a calcium supplement.  To take a daily probiotic supplement.  To take other supplements such as magnesium, fish oil, or vitamin B6.  Take over-the-counter and prescription medicines only as told by your health care provider. These include supplements.  What foods should I limit?  Limit your intake of the following foods, or eat them as told by your dietitian.  Vegetables  Spinach. Rhubarb. Beets. Canned vegetables. Pickles. Olives. Baked potatoes with skin.  Grains  Wheat bran. Baked goods. Salted crackers. Cereals high in sugar.  Meats and other proteins  Nuts. Nut butters. Large portions of meat, poultry, or fish. Salted, precooked, or cured meats, such as sausages, meat loaves, and hot dogs.  Dairy  Cheese.  Beverages  Regular soft drinks. Regular vegetable juice.  Seasonings and condiments  Seasoning blends with salt. Salad dressings. Soy sauce. Ketchup. Barbecue sauce.  Other foods  Canned soups. Canned pasta sauce. Casseroles. Pizza. Lasagna. Frozen meals. Potato chips. French fries.  The items listed above may not be a complete list of foods and beverages you should limit. Contact a dietitian for more information.  What foods should I avoid?  Talk to your dietitian about specific foods you should avoid based on the type of kidney stones you have and your overall health.  Fruits  Grapefruit.  The item listed above may not be a complete list of foods and beverages you should avoid. Contact a dietitian for more information.  Summary  Kidney stones are deposits of minerals and salts that form inside your kidneys.  You can lower your risk of kidney stones by making changes to your diet.  The most important thing you can do is drink enough fluid. Drink enough fluid to keep your urine pale yellow.  Talk to your  dietitian about how much calcium you should have each day, and eat less salt and animal protein as told by your dietitian.  This information is not intended to replace advice given to you by your health care provider. Make sure you discuss any questions you have with your health care provider.  Document Revised: 12/10/2020 Document Reviewed: 12/10/2020  [employer] Patient Education © 2021 [employer]  Low-Purine Eating Plan  A low-purine eating plan involves making food choices to limit your intake of purine. Purine is a kind of uric acid. Too much uric acid in your blood can cause certain conditions, such as gout and kidney stones. Eating a low-purine diet can help control these conditions.  What are tips for following this plan?  Reading food labels  Avoid foods with saturated or Trans fat.  Check the ingredient list of grains-based foods, such as bread and cereal, to make sure that they contain whole grains.  Check the ingredient list of sauces or soups to make sure they do not contain meat or fish.  When choosing soft drinks, check the ingredient list to make sure they do not contain high-fructose corn syrup.  Shopping    Buy plenty of fresh fruits and vegetables.  Avoid buying canned or fresh fish.  Buy dairy products labeled as low-fat or nonfat.  Avoid buying premade or processed foods. These foods are often high in fat, salt (sodium), and added sugar.    Cooking  Use olive oil instead of butter when cooking. Oils like olive oil, canola oil, and sunflower oil contain healthy fats.  Meal planning  Learn which foods do or do not affect you. If you find out that a food tends to cause your gout symptoms to flare up, avoid eating that food. You can enjoy foods that do not cause problems. If you have any questions about a food item, talk with your dietitian or health care provider.  Limit foods high in fat, especially saturated fat. Fat makes it harder for your body to get rid of uric acid.  Choose foods that are  lower in fat and are lean sources of protein.  General guidelines  Limit alcohol intake to no more than 1 drink a day for nonpregnant women and 2 drinks a day for men. One drink equals 12 oz of beer, 5 oz of wine, or 1½ oz of hard liquor. Alcohol can affect the way your body gets rid of uric acid.  Drink plenty of water to keep your urine clear or pale yellow. Fluids can help remove uric acid from your body.  If directed by your health care provider, take a vitamin C supplement.  Work with your health care provider and dietitian to develop a plan to achieve or maintain a healthy weight. Losing weight can help reduce uric acid in your blood.  What foods are recommended?  The items listed may not be a complete list. Talk with your dietitian about what dietary choices are best for you.  Foods low in purines  Foods low in purines do not need to be limited. These include:  All fruits.  All low-purine vegetables, pickles, and olives.  Breads, pasta, rice, cornbread, and popcorn. Cake and other baked goods.  All dairy foods.  Eggs, nuts, and nut butters.  Spices and condiments, such as salt, herbs, and vinegar.  Plant oils, butter, and margarine.  Water, sugar-free soft drinks, tea, coffee, and cocoa.  Vegetable-based soups, broths, sauces, and gravies.  Foods moderate in purines  Foods moderate in purines should be limited to the amounts listed.  ½ cup of asparagus, cauliflower, spinach, mushrooms, or green peas, each day.  2/3 cup uncooked oatmeal, each day.  ¼ cup dry wheat bran or wheat germ, each day.  2-3 ounces of meat or poultry, each day.  4-6 ounces of shellfish, such as crab, lobster, oysters, or shrimp, each day.  1 cup cooked beans, peas, or lentils, each day.  Soup, broths, or bouillon made from meat or fish. Limit these foods as much as possible.  What foods are not recommended?  The items listed may not be a complete list. Talk with your dietitian about what dietary choices are best for you.  Limit your  intake of foods high in purines, including:  Beer and other alcohol.  Meat-based gravy or sauce.  Canned or fresh fish, such as:  Anchovies, sardines, herring, and tuna.  Mussels and scallops.  Codfish, trout, and jocelyn.  Foster.  Organ meats, such as:  Liver or kidney.  Tripe.  Sweetbreads (thymus gland or pancreas).  Wild game or goose.  Yeast or yeast extract supplements.  Drinks sweetened with high-fructose corn syrup.  Summary  Eating a low-purine diet can help control conditions caused by too much uric acid in the body, such as gout or kidney stones.  Choose low-purine foods, limit alcohol, and limit foods high in fat.  You will learn over time which foods do or do not affect you. If you find out that a food tends to cause your gout symptoms to flare up, avoid eating that food.  This information is not intended to replace advice given to you by your health care provider. Make sure you discuss any questions you have with your health care provider.  Document Revised: 04/01/2021 Document Reviewed: 04/01/2021  [employer] Patient Education © 2021 [employer]

## 2022-04-12 ENCOUNTER — PRE-ADMISSION TESTING (OUTPATIENT)
Dept: PREADMISSION TESTING | Facility: HOSPITAL | Age: 42
End: 2022-04-12

## 2022-04-12 ENCOUNTER — APPOINTMENT (OUTPATIENT)
Dept: OTHER | Facility: HOSPITAL | Age: 42
End: 2022-04-12

## 2022-04-12 ENCOUNTER — LAB (OUTPATIENT)
Dept: LAB | Facility: HOSPITAL | Age: 42
End: 2022-04-12

## 2022-04-12 DIAGNOSIS — N20.1 LEFT URETERAL STONE: ICD-10-CM

## 2022-04-12 DIAGNOSIS — R10.9 BILATERAL FLANK PAIN: ICD-10-CM

## 2022-04-12 LAB
ANION GAP SERPL CALCULATED.3IONS-SCNC: 11.3 MMOL/L (ref 5–15)
BACTERIA SPEC AEROBE CULT: NO GROWTH
BUN SERPL-MCNC: 17 MG/DL (ref 6–20)
BUN/CREAT SERPL: 16 (ref 7–25)
CALCIUM SPEC-SCNC: 9.4 MG/DL (ref 8.6–10.5)
CHLORIDE SERPL-SCNC: 103 MMOL/L (ref 98–107)
CO2 SERPL-SCNC: 23.7 MMOL/L (ref 22–29)
CREAT SERPL-MCNC: 1.06 MG/DL (ref 0.76–1.27)
DEPRECATED RDW RBC AUTO: 38.6 FL (ref 37–54)
EGFRCR SERPLBLD CKD-EPI 2021: 91.6 ML/MIN/1.73
ERYTHROCYTE [DISTWIDTH] IN BLOOD BY AUTOMATED COUNT: 12 % (ref 12.3–15.4)
GLUCOSE SERPL-MCNC: 123 MG/DL (ref 65–99)
HCT VFR BLD AUTO: 44.8 % (ref 37.5–51)
HGB BLD-MCNC: 14.9 G/DL (ref 13–17.7)
MCH RBC QN AUTO: 28.9 PG (ref 26.6–33)
MCHC RBC AUTO-ENTMCNC: 33.3 G/DL (ref 31.5–35.7)
MCV RBC AUTO: 86.8 FL (ref 79–97)
PLATELET # BLD AUTO: 280 10*3/MM3 (ref 140–450)
PMV BLD AUTO: 10.9 FL (ref 6–12)
POTASSIUM SERPL-SCNC: 3.2 MMOL/L (ref 3.5–5.2)
RBC # BLD AUTO: 5.16 10*6/MM3 (ref 4.14–5.8)
SARS-COV-2 RNA PNL SPEC NAA+PROBE: NOT DETECTED
SODIUM SERPL-SCNC: 138 MMOL/L (ref 136–145)
WBC NRBC COR # BLD: 7.56 10*3/MM3 (ref 3.4–10.8)

## 2022-04-12 PROCEDURE — U0004 COV-19 TEST NON-CDC HGH THRU: HCPCS | Performed by: NURSE PRACTITIONER

## 2022-04-12 PROCEDURE — C9803 HOPD COVID-19 SPEC COLLECT: HCPCS | Performed by: NURSE PRACTITIONER

## 2022-04-12 PROCEDURE — 85027 COMPLETE CBC AUTOMATED: CPT

## 2022-04-12 PROCEDURE — 36415 COLL VENOUS BLD VENIPUNCTURE: CPT

## 2022-04-12 PROCEDURE — 80048 BASIC METABOLIC PNL TOTAL CA: CPT

## 2022-04-14 ENCOUNTER — HOSPITAL ENCOUNTER (OUTPATIENT)
Facility: HOSPITAL | Age: 42
Setting detail: HOSPITAL OUTPATIENT SURGERY
Discharge: HOME | End: 2022-04-14
Attending: UROLOGY | Admitting: UROLOGY

## 2022-04-14 ENCOUNTER — PATIENT ROUNDING (BHMG ONLY) (OUTPATIENT)
Dept: UROLOGY | Facility: CLINIC | Age: 42
End: 2022-04-14

## 2022-04-14 ENCOUNTER — APPOINTMENT (OUTPATIENT)
Dept: GENERAL RADIOLOGY | Facility: HOSPITAL | Age: 42
End: 2022-04-14

## 2022-04-14 ENCOUNTER — ANESTHESIA EVENT (OUTPATIENT)
Dept: PERIOP | Facility: HOSPITAL | Age: 42
End: 2022-04-14

## 2022-04-14 ENCOUNTER — ANESTHESIA (OUTPATIENT)
Dept: PERIOP | Facility: HOSPITAL | Age: 42
End: 2022-04-14

## 2022-04-14 VITALS
BODY MASS INDEX: 30.86 KG/M2 | DIASTOLIC BLOOD PRESSURE: 73 MMHG | RESPIRATION RATE: 18 BRPM | HEART RATE: 72 BPM | OXYGEN SATURATION: 97 % | TEMPERATURE: 98.4 F | HEIGHT: 66 IN | SYSTOLIC BLOOD PRESSURE: 124 MMHG | WEIGHT: 192 LBS

## 2022-04-14 DIAGNOSIS — N20.1 LEFT URETERAL STONE: ICD-10-CM

## 2022-04-14 DIAGNOSIS — R10.9 BILATERAL FLANK PAIN: ICD-10-CM

## 2022-04-14 PROCEDURE — 25010000002 FENTANYL CITRATE (PF) 50 MCG/ML SOLUTION: Performed by: NURSE ANESTHETIST, CERTIFIED REGISTERED

## 2022-04-14 PROCEDURE — C2617 STENT, NON-COR, TEM W/O DEL: HCPCS | Performed by: UROLOGY

## 2022-04-14 PROCEDURE — 76000 FLUOROSCOPY <1 HR PHYS/QHP: CPT | Performed by: RADIOLOGY

## 2022-04-14 PROCEDURE — 25010000002 ONDANSETRON PER 1 MG: Performed by: NURSE ANESTHETIST, CERTIFIED REGISTERED

## 2022-04-14 PROCEDURE — 25010000002 IOPAMIDOL 61 % SOLUTION: Performed by: UROLOGY

## 2022-04-14 PROCEDURE — 25010000002 GENTAMICIN PER 80 MG

## 2022-04-14 PROCEDURE — 25010000002 GENTAMICIN PER 80 MG: Performed by: UROLOGY

## 2022-04-14 PROCEDURE — 52356 CYSTO/URETERO W/LITHOTRIPSY: CPT | Performed by: UROLOGY

## 2022-04-14 PROCEDURE — 25010000002 MIDAZOLAM PER 1 MG: Performed by: NURSE ANESTHETIST, CERTIFIED REGISTERED

## 2022-04-14 PROCEDURE — 87086 URINE CULTURE/COLONY COUNT: CPT | Performed by: UROLOGY

## 2022-04-14 PROCEDURE — 25010000002 KETOROLAC TROMETHAMINE PER 15 MG: Performed by: NURSE ANESTHETIST, CERTIFIED REGISTERED

## 2022-04-14 PROCEDURE — 25010000002 IOPAMIDOL 61 % SOLUTION

## 2022-04-14 PROCEDURE — 76000 FLUOROSCOPY <1 HR PHYS/QHP: CPT

## 2022-04-14 PROCEDURE — 25010000002 GENTAMICIN PER 80 MG: Performed by: NURSE PRACTITIONER

## 2022-04-14 PROCEDURE — 25010000002 PROPOFOL 10 MG/ML EMULSION: Performed by: NURSE ANESTHETIST, CERTIFIED REGISTERED

## 2022-04-14 PROCEDURE — C1769 GUIDE WIRE: HCPCS | Performed by: UROLOGY

## 2022-04-14 DEVICE — URETERAL STENT
Type: IMPLANTABLE DEVICE | Site: URETER | Status: FUNCTIONAL
Brand: POLARIS™ ULTRA

## 2022-04-14 RX ORDER — MIDAZOLAM HYDROCHLORIDE 1 MG/ML
1 INJECTION INTRAMUSCULAR; INTRAVENOUS
Status: DISCONTINUED | OUTPATIENT
Start: 2022-04-14 | End: 2022-04-14 | Stop reason: HOSPADM

## 2022-04-14 RX ORDER — PHENAZOPYRIDINE HYDROCHLORIDE 200 MG/1
200 TABLET, FILM COATED ORAL 3 TIMES DAILY PRN
Qty: 20 TABLET | Refills: 0 | Status: SHIPPED | OUTPATIENT
Start: 2022-04-14

## 2022-04-14 RX ORDER — SODIUM CHLORIDE, SODIUM LACTATE, POTASSIUM CHLORIDE, CALCIUM CHLORIDE 600; 310; 30; 20 MG/100ML; MG/100ML; MG/100ML; MG/100ML
125 INJECTION, SOLUTION INTRAVENOUS ONCE
Status: COMPLETED | OUTPATIENT
Start: 2022-04-14 | End: 2022-04-14

## 2022-04-14 RX ORDER — FENTANYL CITRATE 50 UG/ML
INJECTION, SOLUTION INTRAMUSCULAR; INTRAVENOUS AS NEEDED
Status: DISCONTINUED | OUTPATIENT
Start: 2022-04-14 | End: 2022-04-14 | Stop reason: SURG

## 2022-04-14 RX ORDER — SODIUM CHLORIDE 9 MG/ML
INJECTION, SOLUTION INTRAVENOUS AS NEEDED
Status: DISCONTINUED | OUTPATIENT
Start: 2022-04-14 | End: 2022-04-14 | Stop reason: HOSPADM

## 2022-04-14 RX ORDER — OXYCODONE HYDROCHLORIDE AND ACETAMINOPHEN 5; 325 MG/1; MG/1
1 TABLET ORAL ONCE AS NEEDED
Status: COMPLETED | OUTPATIENT
Start: 2022-04-14 | End: 2022-04-14

## 2022-04-14 RX ORDER — KETOROLAC TROMETHAMINE 30 MG/ML
INJECTION, SOLUTION INTRAMUSCULAR; INTRAVENOUS AS NEEDED
Status: DISCONTINUED | OUTPATIENT
Start: 2022-04-14 | End: 2022-04-14 | Stop reason: SURG

## 2022-04-14 RX ORDER — DROPERIDOL 2.5 MG/ML
0.62 INJECTION, SOLUTION INTRAMUSCULAR; INTRAVENOUS ONCE AS NEEDED
Status: DISCONTINUED | OUTPATIENT
Start: 2022-04-14 | End: 2022-04-14 | Stop reason: HOSPADM

## 2022-04-14 RX ORDER — FAMOTIDINE 10 MG/ML
INJECTION, SOLUTION INTRAVENOUS AS NEEDED
Status: DISCONTINUED | OUTPATIENT
Start: 2022-04-14 | End: 2022-04-14 | Stop reason: SURG

## 2022-04-14 RX ORDER — KETOROLAC TROMETHAMINE 30 MG/ML
30 INJECTION, SOLUTION INTRAMUSCULAR; INTRAVENOUS EVERY 6 HOURS PRN
Status: DISCONTINUED | OUTPATIENT
Start: 2022-04-14 | End: 2022-04-14 | Stop reason: HOSPADM

## 2022-04-14 RX ORDER — ONDANSETRON 2 MG/ML
4 INJECTION INTRAMUSCULAR; INTRAVENOUS AS NEEDED
Status: DISCONTINUED | OUTPATIENT
Start: 2022-04-14 | End: 2022-04-14 | Stop reason: HOSPADM

## 2022-04-14 RX ORDER — NITROFURANTOIN 25; 75 MG/1; MG/1
100 CAPSULE ORAL 2 TIMES DAILY
Qty: 10 CAPSULE | Refills: 0 | Status: SHIPPED | OUTPATIENT
Start: 2022-04-14

## 2022-04-14 RX ORDER — SODIUM CHLORIDE, SODIUM LACTATE, POTASSIUM CHLORIDE, CALCIUM CHLORIDE 600; 310; 30; 20 MG/100ML; MG/100ML; MG/100ML; MG/100ML
100 INJECTION, SOLUTION INTRAVENOUS ONCE AS NEEDED
Status: DISCONTINUED | OUTPATIENT
Start: 2022-04-14 | End: 2022-04-14 | Stop reason: HOSPADM

## 2022-04-14 RX ORDER — ATROPA BELLADONNA AND OPIUM 16.2; 3 MG/1; MG/1
SUPPOSITORY RECTAL AS NEEDED
Status: DISCONTINUED | OUTPATIENT
Start: 2022-04-14 | End: 2022-04-14 | Stop reason: HOSPADM

## 2022-04-14 RX ORDER — MAGNESIUM HYDROXIDE 1200 MG/15ML
LIQUID ORAL AS NEEDED
Status: DISCONTINUED | OUTPATIENT
Start: 2022-04-14 | End: 2022-04-14 | Stop reason: HOSPADM

## 2022-04-14 RX ORDER — GENTAMICIN SULFATE 40 MG/ML
INJECTION, SOLUTION INTRAMUSCULAR; INTRAVENOUS AS NEEDED
Status: DISCONTINUED | OUTPATIENT
Start: 2022-04-14 | End: 2022-04-14 | Stop reason: HOSPADM

## 2022-04-14 RX ORDER — SODIUM CHLORIDE 0.9 % (FLUSH) 0.9 %
10 SYRINGE (ML) INJECTION EVERY 12 HOURS SCHEDULED
Status: DISCONTINUED | OUTPATIENT
Start: 2022-04-14 | End: 2022-04-14 | Stop reason: HOSPADM

## 2022-04-14 RX ORDER — SODIUM CHLORIDE, SODIUM LACTATE, POTASSIUM CHLORIDE, CALCIUM CHLORIDE 600; 310; 30; 20 MG/100ML; MG/100ML; MG/100ML; MG/100ML
INJECTION, SOLUTION INTRAVENOUS CONTINUOUS PRN
Status: DISCONTINUED | OUTPATIENT
Start: 2022-04-14 | End: 2022-04-14 | Stop reason: SURG

## 2022-04-14 RX ORDER — GENTAMICIN SULFATE 80 MG/100ML
80 INJECTION, SOLUTION INTRAVENOUS ONCE
Status: COMPLETED | OUTPATIENT
Start: 2022-04-14 | End: 2022-04-14

## 2022-04-14 RX ORDER — SODIUM CHLORIDE 0.9 % (FLUSH) 0.9 %
10 SYRINGE (ML) INJECTION AS NEEDED
Status: DISCONTINUED | OUTPATIENT
Start: 2022-04-14 | End: 2022-04-14 | Stop reason: HOSPADM

## 2022-04-14 RX ORDER — IPRATROPIUM BROMIDE AND ALBUTEROL SULFATE 2.5; .5 MG/3ML; MG/3ML
3 SOLUTION RESPIRATORY (INHALATION) ONCE AS NEEDED
Status: DISCONTINUED | OUTPATIENT
Start: 2022-04-14 | End: 2022-04-14 | Stop reason: HOSPADM

## 2022-04-14 RX ORDER — MIDAZOLAM HYDROCHLORIDE 1 MG/ML
INJECTION INTRAMUSCULAR; INTRAVENOUS AS NEEDED
Status: DISCONTINUED | OUTPATIENT
Start: 2022-04-14 | End: 2022-04-14 | Stop reason: SURG

## 2022-04-14 RX ORDER — FENTANYL CITRATE 50 UG/ML
50 INJECTION, SOLUTION INTRAMUSCULAR; INTRAVENOUS
Status: DISCONTINUED | OUTPATIENT
Start: 2022-04-14 | End: 2022-04-14 | Stop reason: HOSPADM

## 2022-04-14 RX ORDER — ONDANSETRON 2 MG/ML
INJECTION INTRAMUSCULAR; INTRAVENOUS AS NEEDED
Status: DISCONTINUED | OUTPATIENT
Start: 2022-04-14 | End: 2022-04-14 | Stop reason: SURG

## 2022-04-14 RX ORDER — PROPOFOL 10 MG/ML
VIAL (ML) INTRAVENOUS AS NEEDED
Status: DISCONTINUED | OUTPATIENT
Start: 2022-04-14 | End: 2022-04-14 | Stop reason: SURG

## 2022-04-14 RX ADMIN — OXYCODONE HYDROCHLORIDE AND ACETAMINOPHEN 1 TABLET: 5; 325 TABLET ORAL at 13:25

## 2022-04-14 RX ADMIN — SODIUM CHLORIDE, POTASSIUM CHLORIDE, SODIUM LACTATE AND CALCIUM CHLORIDE 125 ML/HR: 600; 310; 30; 20 INJECTION, SOLUTION INTRAVENOUS at 10:24

## 2022-04-14 RX ADMIN — PROPOFOL 150 MG: 10 INJECTION, EMULSION INTRAVENOUS at 11:53

## 2022-04-14 RX ADMIN — FENTANYL CITRATE 100 MCG: 50 INJECTION INTRAMUSCULAR; INTRAVENOUS at 11:50

## 2022-04-14 RX ADMIN — KETOROLAC TROMETHAMINE 30 MG: 30 INJECTION, SOLUTION INTRAMUSCULAR at 11:50

## 2022-04-14 RX ADMIN — MIDAZOLAM 2 MG: 1 INJECTION INTRAMUSCULAR; INTRAVENOUS at 11:50

## 2022-04-14 RX ADMIN — GENTAMICIN SULFATE 80 MG: 80 INJECTION, SOLUTION INTRAVENOUS at 12:01

## 2022-04-14 RX ADMIN — ONDANSETRON 4 MG: 2 INJECTION INTRAMUSCULAR; INTRAVENOUS at 11:50

## 2022-04-14 RX ADMIN — SODIUM CHLORIDE, POTASSIUM CHLORIDE, SODIUM LACTATE AND CALCIUM CHLORIDE: 600; 310; 30; 20 INJECTION, SOLUTION INTRAVENOUS at 11:48

## 2022-04-14 RX ADMIN — FAMOTIDINE 20 MG: 10 INJECTION INTRAVENOUS at 11:50

## 2022-04-14 NOTE — OP NOTE
CYSTOSCOPY URETEROSCOPY LASER LITHOTRIPSY  Procedure Note    Amaury Mendoza  4/14/2022    Pre-op Diagnosis:   Left ureteral stone [N20.1]  Bilateral flank pain [R10.9]    Post-op Diagnosis:     Post-Op Diagnosis Codes:     * Left ureteral stone [N20.1]     * Bilateral flank pain [R10.9]    Procedure/CPT® Codes:      Procedure(s):  CYSTOSCOPY URETEROSCOPY LASER LITHOTRIPSY WITH  LEFT STENT    Surgeon(s):  Tariq Riley MD    Anesthesia: see anesthesia record    Staff:   Circulator: Trevor Tabor RN; Yudelka Grady RN  Scrub Person: Jinny De León LPN; Cherry Huber    Estimated Blood Loss: minimal  Urine Voided: * No values recorded between 4/14/2022 11:51 AM and 4/14/2022 12:40 PM *    Specimens:                ID Type Source Tests Collected by Time   1 (Not marked as sent) : Urine C&S Urine Urine, Catheter URINE CULTURE Tariq Riley MD 4/14/2022 1204         Drains: 5f x 24 cm left JJ stent ON STRING    Findings:   1.  Cystoscopy with no masses or lesions within the walls of bladder  2.  Ureteroscopy shows a stone in the proximal ureter  3.  Stone fragmented into small passable fragments  4.  Retrograde pyelogram shows no hydronephrosis or filling defect in the renal pelvis  5.  5 Albanian by 24 cm left double-J ureteral stent on string placed in the left kidney    Blood: N/A    Complications: None immediate    Indication for Procedure: Mr. Mendoza is a 41-year-old gentleman who was seen in outside ER with a left proximal ureteral stone about 5 mm in size.  He was seen in the office and given the option between medical expulsive therapy and definitive stone therapy.  Reviewing the risks and benefits of each he elected to move forward with ureteroscopy and laser lithotripsy.    Description of Procedure: The patient was seen and examined in the preoperative area.  The risk, benefits, and alternatives were explained to the patient informed consent was obtained.  He was then taken to the operating theater  and placed on table in supine position.  Venodyne boots were placed on bilateral lower extremities.  General anesthesia was induced without any complication.  He was placed in dorsolithotomy position with all pressure points padded and secured.  He was prepped and draped in the usual sterile fashion a timeout was taken.    A 21 Jamaican rigid cystoscope was passed through the urethra and into the bladder.  A cystoscopy was performed in a systematic fashion with no masses or lesions noted within the walls of the bladder.  Attention was then turned to the left ureteral orifice and a 0.035 sensor wire was passed through the scope into the left renal pelvis under fluoroscopic guidance.  A semirigid ureteroscope was then passed into the ureter and up to the proximal ureter until a stone was encountered.  A 200 µm laser fiber was then used to fragment the stone into small passable fragments.  I then attempted to pass a flexible scope into the renal pelvis to perform pyeloscopy, however, we were unable to pass a flexible scope into the renal pelvis.    A retrograde pyelogram was then performed and there were no filling defect or hydronephrosis noted in the renal pelvis.    The semirigid scope was then withdrawn and a 5 Jamaican by 24 cm double-J ureteral stent on string was passed over the wire into the left renal pelvis under fluoroscopic guidance.  Once in position the wire was pulled and the stent was deployed.  The bladder was then drained and he was given lidocaine jelly as well as a B&O suppository.  The patient tolerated the procedure well and there were no complications to the procedure he was taken PACU in stable extubated condition.    Disposition: The patient will be discharged once PACU criteria is met.  He has prescriptions for pain medication as well as tamsulosin already.  He was given a prescription for Pyridium and Macrobid.  He will be instructed to remove his stent on Monday, April 18, 2022.  I will have him  follow-up in 6 weeks with a renal ultrasound.    Tariq Riley MD     Date: 4/14/2022  Time: 12:42 EDT

## 2022-04-14 NOTE — ANESTHESIA PROCEDURE NOTES
Airway  Urgency: elective    Date/Time: 4/14/2022 11:55 AM  Airway not difficult    General Information and Staff    Patient location during procedure: OR  CRNA: Arely Owens CRNA    Indications and Patient Condition  Indications for airway management: airway protection    Preoxygenated: yes  Mask difficulty assessment: 0 - not attempted    Final Airway Details  Final airway type: supraglottic airway      Successful airway: classic and unique  Size 4    Number of attempts at approach: 1  Assessment: lips, teeth, and gum same as pre-op

## 2022-04-14 NOTE — PROGRESS NOTES
April 14, 2022    Hello, may I speak with Amaury Mendoza?    My name is April      I am  with [facility]  [facility]  [address] [place] [address] [phone].    Before we get started may I verify your date of birth? 1980    I am calling to officially welcome you to our practice and ask about your recent visit. Is this a good time to talk? yes    Tell me about your visit with us. What things went well?  no complaints       We're always looking for ways to make our patients' experiences even better. Do you have recommendations on ways we may improve?  no    Overall were you satisfied with your first visit to our practice? yes       I appreciate you taking the time to speak with me today. Is there anything else I can do for you? no      Thank you, and have a great day.

## 2022-04-14 NOTE — ANESTHESIA PREPROCEDURE EVALUATION
Anesthesia Evaluation     Patient summary reviewed and Nursing notes reviewed   no history of anesthetic complications:  NPO Solid Status: > 8 hours  NPO Liquid Status: > 8 hours           Airway   Mallampati: II  TM distance: >3 FB  Neck ROM: full  Dental    (+) poor dentition        Pulmonary - negative pulmonary ROS    breath sounds clear to auscultation  Cardiovascular   Exercise tolerance: good (4-7 METS)    Rhythm: regular  Rate: normal        Neuro/Psych- negative ROS  GI/Hepatic/Renal/Endo    (+) obesity,   renal disease stones,     Musculoskeletal (-) negative ROS    Abdominal   (+) obese,     Abdomen: soft.   Substance History - negative use     OB/GYN          Other - negative ROS                       Anesthesia Plan    ASA 2     general     intravenous induction     Anesthetic plan, all risks, benefits, and alternatives have been provided, discussed and informed consent has been obtained with: patient.  Use of blood products discussed with consented to blood products.   Plan discussed with CRNA.        CODE STATUS:

## 2022-04-15 LAB — BACTERIA SPEC AEROBE CULT: NO GROWTH

## 2022-04-15 NOTE — ANESTHESIA POSTPROCEDURE EVALUATION
Patient: Amaury Mendoza    Procedure Summary     Date: 04/14/22 Room / Location: [facility] OR  / [facility] OR    Anesthesia Start: 1151 Anesthesia Stop: 1244    Procedure: CYSTOSCOPY URETEROSCOPY LASER LITHOTRIPSY WITH  LEFT STENT (Left ) Diagnosis:       Left ureteral stone      Bilateral flank pain      (Left ureteral stone [N20.1])      (Bilateral flank pain [R10.9])    Surgeons: Tariq Riley MD Provider: Ji Little MD    Anesthesia Type: general ASA Status: 2          Anesthesia Type: general    Vitals  Vitals Value Taken Time   /77 04/14/22 1315   Temp 98.2 °F (36.8 °C) 04/14/22 1245   Pulse 82 04/14/22 1315   Resp 13 04/14/22 1315   SpO2 95 % 04/14/22 1315           Post Anesthesia Care and Evaluation    Patient location during evaluation: PACU  Patient participation: complete - patient participated  Level of consciousness: awake  Pain score: 1  Pain management: adequate  Airway patency: patent  Anesthetic complications: No anesthetic complications  PONV Status: controlled  Cardiovascular status: hemodynamically stable  Respiratory status: nasal cannula  Hydration status: acceptable

## 2022-04-20 ENCOUNTER — OFFICE VISIT (OUTPATIENT)
Dept: UROLOGY | Facility: CLINIC | Age: 42
End: 2022-04-20

## 2022-04-20 VITALS — BODY MASS INDEX: 30.86 KG/M2 | WEIGHT: 192 LBS | HEIGHT: 66 IN

## 2022-04-20 DIAGNOSIS — N30.00 ACUTE CYSTITIS WITHOUT HEMATURIA: Primary | ICD-10-CM

## 2022-04-20 PROCEDURE — 96372 THER/PROPH/DIAG INJ SC/IM: CPT | Performed by: UROLOGY

## 2022-04-20 PROCEDURE — 99214 OFFICE O/P EST MOD 30 MIN: CPT | Performed by: UROLOGY

## 2022-04-20 PROCEDURE — 87086 URINE CULTURE/COLONY COUNT: CPT | Performed by: UROLOGY

## 2022-04-20 RX ORDER — CEFTRIAXONE 1 G/1
1 INJECTION, POWDER, FOR SOLUTION INTRAMUSCULAR; INTRAVENOUS ONCE
Status: COMPLETED | OUTPATIENT
Start: 2022-04-20 | End: 2022-04-20

## 2022-04-20 RX ORDER — CEFDINIR 300 MG/1
300 CAPSULE ORAL 2 TIMES DAILY
Qty: 28 CAPSULE | Refills: 0 | Status: SHIPPED | OUTPATIENT
Start: 2022-04-20 | End: 2022-05-04

## 2022-04-20 RX ADMIN — CEFTRIAXONE 1 G: 1 INJECTION, POWDER, FOR SOLUTION INTRAMUSCULAR; INTRAVENOUS at 13:44

## 2022-04-20 NOTE — PROGRESS NOTES
Follow Up Office Visit      Patient Name: Amaury Mendoza  : 1980   MRN: [id number]     Chief Complaint:    Chief Complaint   Patient presents with   • Post-op Problem       Referring Provider: No ref. provider found    History of Present Illness: Amaury Mendoza is a 41 y.o. male who presents today for follow up of after ureteroscopy and laser lithotripsy on the .  He reports he was able to take his stent out without issue.  However, afterwards he had a lot of bladder spasm and now he complains of dysuria.  No gross hematuria.    He comes in today because of the dysuria and spasm.    He has been on Macrobid until Monday.  He states he has been on a muscle relaxer 2 mg that seems to help his spasm.      Subjective      Review of System: Review of Systems   Constitutional: Negative for chills, fatigue, fever and unexpected weight change.   HENT: Negative for congestion, rhinorrhea and sore throat.    Eyes: Negative for visual disturbance.   Respiratory: Negative for apnea, cough, chest tightness and shortness of breath.    Cardiovascular: Negative for chest pain.   Gastrointestinal: Negative for abdominal pain, constipation, diarrhea, nausea and vomiting.   Endocrine: Negative for polydipsia and polyuria.   Genitourinary: Negative for difficulty urinating, dysuria, enuresis, flank pain, frequency, genital sores, hematuria and urgency.   Musculoskeletal: Negative for gait problem.   Skin: Negative for rash and wound.   Allergic/Immunologic: Negative for immunocompromised state.   Neurological: Negative for dizziness, tremors, syncope, weakness and light-headedness.   Hematological: Does not bruise/bleed easily.      I have reviewed the ROS documented by my clinical staff, I have updated appropriately and I agree. Tariq Riley MD    I have reviewed and the following portions of the patient's history were updated as appropriate: past family history, past medical history, past social history, past  surgical history and problem list.    Past Medical History:   Past Medical History:   Diagnosis Date   • Kidney stones        Past Surgical History:  Past Surgical History:   Procedure Laterality Date   • APPENDECTOMY     • CYSTOSCOPY URETEROSCOPY LASER LITHOTRIPSY Left 4/14/2022    Procedure: CYSTOSCOPY URETEROSCOPY LASER LITHOTRIPSY WITH  LEFT STENT;  Surgeon: Tariq Riley MD;  Location: [facility];  Service: Urology;  Laterality: Left;       Family History:   family history includes No Known Problems in his father and mother.   Otherwise pertinent FHx was reviewed and not pertinent to current issue.    Social History:    reports that he has never smoked. He has never used smokeless tobacco. He reports previous alcohol use. He reports that he does not use drugs.    Medications:     Current Outpatient Medications:   •  HYDROcodone-acetaminophen (NORCO) 5-325 MG per tablet, Take 1 tablet by mouth Every 6 (Six) Hours As Needed. for pain, Disp: , Rfl:   •  nitrofurantoin, macrocrystal-monohydrate, (Macrobid) 100 MG capsule, Take 1 capsule by mouth 2 (Two) Times a Day., Disp: 10 capsule, Rfl: 0  •  tamsulosin (FLOMAX) 0.4 MG capsule 24 hr capsule, Take 1 capsule by mouth Daily., Disp: 30 capsule, Rfl: 1  •  cefdinir (OMNICEF) 300 MG capsule, Take 1 capsule by mouth 2 (Two) Times a Day for 14 days., Disp: 28 capsule, Rfl: 0  •  ketorolac (TORADOL) 10 MG tablet, Take 1 tablet by mouth Every 6 (Six) Hours As Needed for Moderate Pain ., Disp: 16 tablet, Rfl: 0  •  ondansetron (Zofran) 4 MG tablet, Take 1 tablet by mouth Every 12 (Twelve) Hours As Needed for Nausea., Disp: 20 tablet, Rfl: 0  •  phenazopyridine (Pyridium) 200 MG tablet, Take 1 tablet by mouth 3 (Three) Times a Day As Needed for Bladder Spasms., Disp: 20 tablet, Rfl: 0  •  polyethylene glycol (MiraLax) 17 g packet, Take 17 g by mouth Daily., Disp: 30 each, Rfl: 3    Allergies:   No Known Allergies    Post void residual bladder scan:    0 mL    Objective  "    Physical Exam:   Vital Signs:   Vitals:    04/20/22 1304   Weight: 87.1 kg (192 lb)   Height: 167.6 cm (66\")     Body mass index is 30.99 kg/m².     Physical Exam  Vitals and nursing note reviewed.   Constitutional:       General: He is awake. He is not in acute distress.     Appearance: Normal appearance. He is well-developed. He is obese.   HENT:      Head: Normocephalic and atraumatic.      Right Ear: External ear normal.      Left Ear: External ear normal.      Nose: Nose normal.   Eyes:      Conjunctiva/sclera: Conjunctivae normal.   Pulmonary:      Effort: Pulmonary effort is normal.   Abdominal:      General: There is no distension.      Palpations: Abdomen is soft. There is no mass.      Tenderness: There is no abdominal tenderness. There is no right CVA tenderness, left CVA tenderness, guarding or rebound.      Hernia: No hernia is present. There is no hernia in the left inguinal area or right inguinal area.   Genitourinary:     Pubic Area: No rash.       Rectum: No mass or tenderness. Normal anal tone.   Musculoskeletal:      Cervical back: Normal range of motion.   Lymphadenopathy:      Lower Body: No right inguinal adenopathy. No left inguinal adenopathy.   Skin:     General: Skin is warm.   Neurological:      General: No focal deficit present.      Mental Status: He is alert and oriented to person, place, and time.   Psychiatric:         Behavior: Behavior normal. Behavior is cooperative.         Labs:   Brief Urine Lab Results  (Last result in the past 365 days)      Color   Clarity   Blood   Leuk Est   Nitrite   Protein   CREAT   Urine HCG        04/11/22 0958 Yellow   Clear   3+   Negative   Negative   1+                 Urine Culture    Urine Culture 4/11/22 4/14/22   Urine Culture No growth No growth              Lab Results   Component Value Date    GLUCOSE 123 (H) 04/12/2022    CALCIUM 9.4 04/12/2022     04/12/2022    K 3.2 (L) 04/12/2022    CO2 23.7 04/12/2022     04/12/2022    " BUN 17 04/12/2022    CREATININE 1.06 04/12/2022    BCR 16.0 04/12/2022    ANIONGAP 11.3 04/12/2022       Lab Results   Component Value Date    WBC 7.56 04/12/2022    HGB 14.9 04/12/2022    HCT 44.8 04/12/2022    MCV 86.8 04/12/2022     04/12/2022       No results found for: PSA    Images:   XR Abdomen KUB    Result Date: 4/11/2022  Appearance concerning for a large left ureteral stone measuring just over 4 mm adjacent to the left L4 transverse process.  This report was finalized on 4/11/2022 11:26 AM by Dr. Jeffy Carr MD.      [facility]    Result Date: 4/14/2022  As above.  This report was finalized on 4/14/2022 1:08 PM by Dr. Abiel Ortiz MD.        Measures:   Tobacco:   Amaury Mendoza  reports that he has never smoked. He has never used smokeless tobacco..      Urine Incontinence: Patient reports that he is not currently experiencing any symptoms of urinary incontinence.      Assessment / Plan      Assessment/Plan:   41 y.o. male who presented today for follow up after ureteroscopy and laser lithotripsy.  He reports he has had significant dysuria and bladder spasm since his stent has come out.  He was given a shot of ceftriaxone in the office and a prescription for omnicef was sent to the pharmacy.  I advised him to keep his follow up and we will have him come in sooner.      Diagnoses and all orders for this visit:    1. Acute cystitis without hematuria (Primary)  -     cefdinir (OMNICEF) 300 MG capsule; Take 1 capsule by mouth 2 (Two) Times a Day for 14 days.  Dispense: 28 capsule; Refill: 0  -     Urine Culture - Urine, Urine, Clean Catch           Follow Up:   Return for Next scheduled follow up.    I spent approximately 30 minutes providing clinical care for this patient; including review of patient's chart and provider documentation, face to face time spent with patient in examination room (obtaining history, performing physical exam, discussing diagnosis and management options), placing  orders, and completing patient documentation.     Tariq Riley MD  [facility]

## 2022-04-21 LAB — BACTERIA SPEC AEROBE CULT: NO GROWTH

## 2022-04-22 ENCOUNTER — TELEPHONE (OUTPATIENT)
Dept: UROLOGY | Facility: CLINIC | Age: 42
End: 2022-04-22

## 2022-04-22 ENCOUNTER — DOCUMENTATION (OUTPATIENT)
Dept: UROLOGY | Facility: CLINIC | Age: 42
End: 2022-04-22

## 2022-04-22 NOTE — TELEPHONE ENCOUNTER
Called patient to see if his symptoms have improved.  He reports that he is 100% better and is very happy now.  He states that he has completely cured.  I advised patient to finish out his course of antibiotics and I will see him back at his scheduled visit.

## 2022-06-06 ENCOUNTER — OFFICE VISIT (OUTPATIENT)
Dept: UROLOGY | Facility: CLINIC | Age: 42
End: 2022-06-06

## 2022-06-06 VITALS — BODY MASS INDEX: 30.86 KG/M2 | WEIGHT: 192 LBS | HEIGHT: 66 IN

## 2022-06-06 DIAGNOSIS — R35.0 FREQUENCY OF URINATION: Primary | ICD-10-CM

## 2022-06-06 LAB
BILIRUB BLD-MCNC: NEGATIVE MG/DL
CLARITY, POC: CLEAR
COLOR UR: YELLOW
EXPIRATION DATE: NORMAL
GLUCOSE UR STRIP-MCNC: NEGATIVE MG/DL
KETONES UR QL: NEGATIVE
LEUKOCYTE EST, POC: NEGATIVE
Lab: NORMAL
NITRITE UR-MCNC: NEGATIVE MG/ML
PH UR: 6 [PH] (ref 5–8)
PROT UR STRIP-MCNC: NEGATIVE MG/DL
RBC # UR STRIP: NEGATIVE /UL
SP GR UR: 1.02 (ref 1–1.03)
UROBILINOGEN UR QL: NORMAL

## 2022-06-06 PROCEDURE — 99214 OFFICE O/P EST MOD 30 MIN: CPT | Performed by: UROLOGY

## 2022-06-06 NOTE — PROGRESS NOTES
Follow Up Office Visit      Patient Name: Amaury Mendoza  : 1980   MRN: [id number]     Chief Complaint:    Chief Complaint   Patient presents with   • Nephrolithiasis     Surgery fu        Referring Provider: No ref. provider found    History of Present Illness: Amaury Mendoza is a 42 y.o. male who presents today for follow up of  Left ureteral stone.  He reports he has done well since finishing his antibiotics.  He reports he had worsening irritation with the pyridium.  He states that once he stopped it he felt much better.  He reports that he removed his stent without any issues.  No dysuria or gross hematuria.  No flank pain or discomfort.      Subjective      Review of System: Review of Systems   Constitutional: Negative for chills, fatigue, fever and unexpected weight change.   HENT: Negative for congestion, rhinorrhea and sore throat.    Eyes: Negative for visual disturbance.   Respiratory: Negative for apnea, cough, chest tightness and shortness of breath.    Cardiovascular: Negative for chest pain.   Gastrointestinal: Negative for abdominal pain, constipation, diarrhea, nausea and vomiting.   Endocrine: Negative for polydipsia and polyuria.   Genitourinary: Negative for difficulty urinating, dysuria, enuresis, flank pain, frequency, genital sores, hematuria and urgency.   Musculoskeletal: Negative for gait problem.   Skin: Negative for rash and wound.   Allergic/Immunologic: Negative for immunocompromised state.   Neurological: Negative for dizziness, tremors, syncope, weakness and light-headedness.   Hematological: Does not bruise/bleed easily.      I have reviewed the ROS documented by my clinical staff, I have updated appropriately and I agree. Tariq Riley MD    I have reviewed and the following portions of the patient's history were updated as appropriate: past family history, past medical history, past social history, past surgical history and problem list.    Past Medical History:   Past  "Medical History:   Diagnosis Date   • Kidney stones        Past Surgical History:  Past Surgical History:   Procedure Laterality Date   • APPENDECTOMY     • CYSTOSCOPY URETEROSCOPY LASER LITHOTRIPSY Left 4/14/2022    Procedure: CYSTOSCOPY URETEROSCOPY LASER LITHOTRIPSY WITH  LEFT STENT;  Surgeon: Tariq Riley MD;  Location: [facility];  Service: Urology;  Laterality: Left;       Family History:   family history includes No Known Problems in his father and mother.   Otherwise pertinent FHx was reviewed and not pertinent to current issue.    Social History:    reports that he has never smoked. He has never used smokeless tobacco. He reports previous alcohol use. He reports that he does not use drugs.    Medications:     Current Outpatient Medications:   •  HYDROcodone-acetaminophen (NORCO) 5-325 MG per tablet, Take 1 tablet by mouth Every 6 (Six) Hours As Needed. for pain, Disp: , Rfl:   •  ketorolac (TORADOL) 10 MG tablet, Take 1 tablet by mouth Every 6 (Six) Hours As Needed for Moderate Pain ., Disp: 16 tablet, Rfl: 0  •  nitrofurantoin, macrocrystal-monohydrate, (Macrobid) 100 MG capsule, Take 1 capsule by mouth 2 (Two) Times a Day., Disp: 10 capsule, Rfl: 0  •  ondansetron (Zofran) 4 MG tablet, Take 1 tablet by mouth Every 12 (Twelve) Hours As Needed for Nausea., Disp: 20 tablet, Rfl: 0  •  phenazopyridine (Pyridium) 200 MG tablet, Take 1 tablet by mouth 3 (Three) Times a Day As Needed for Bladder Spasms., Disp: 20 tablet, Rfl: 0  •  polyethylene glycol (MiraLax) 17 g packet, Take 17 g by mouth Daily., Disp: 30 each, Rfl: 3  •  tamsulosin (FLOMAX) 0.4 MG capsule 24 hr capsule, Take 1 capsule by mouth Daily., Disp: 30 capsule, Rfl: 1    Allergies:   No Known Allergies      Objective     Physical Exam:   Vital Signs:   Vitals:    06/06/22 1337   Weight: 87.1 kg (192 lb)   Height: 167.6 cm (66\")     Body mass index is 30.99 kg/m².     Physical Exam  Vitals and nursing note reviewed.   Constitutional:       General: " He is awake. He is not in acute distress.     Appearance: Normal appearance. He is well-developed. He is obese.   HENT:      Head: Normocephalic and atraumatic.      Right Ear: External ear normal.      Left Ear: External ear normal.      Nose: Nose normal.   Eyes:      Conjunctiva/sclera: Conjunctivae normal.   Pulmonary:      Effort: Pulmonary effort is normal.   Abdominal:      General: There is no distension.      Palpations: Abdomen is soft. There is no mass.      Tenderness: There is no abdominal tenderness. There is no right CVA tenderness, left CVA tenderness, guarding or rebound.      Hernia: No hernia is present. There is no hernia in the left inguinal area or right inguinal area.   Genitourinary:     Pubic Area: No rash.       Rectum: No mass or tenderness. Normal anal tone.   Musculoskeletal:      Cervical back: Normal range of motion.   Lymphadenopathy:      Lower Body: No right inguinal adenopathy. No left inguinal adenopathy.   Skin:     General: Skin is warm.   Neurological:      General: No focal deficit present.      Mental Status: He is alert and oriented to person, place, and time.   Psychiatric:         Behavior: Behavior normal. Behavior is cooperative.         Labs:   Brief Urine Lab Results  (Last result in the past 365 days)      Color   Clarity   Blood   Leuk Est   Nitrite   Protein   CREAT   Urine HCG        06/06/22 1338 Yellow   Clear   Negative   Negative   Negative   Negative                 Urine Culture    Urine Culture 4/11/22 4/14/22 4/20/22   Urine Culture No growth No growth No growth              Lab Results   Component Value Date    GLUCOSE 123 (H) 04/12/2022    CALCIUM 9.4 04/12/2022     04/12/2022    K 3.2 (L) 04/12/2022    CO2 23.7 04/12/2022     04/12/2022    BUN 17 04/12/2022    CREATININE 1.06 04/12/2022    BCR 16.0 04/12/2022    ANIONGAP 11.3 04/12/2022       Lab Results   Component Value Date    WBC 7.56 04/12/2022    HGB 14.9 04/12/2022    HCT 44.8  04/12/2022    MCV 86.8 04/12/2022     04/12/2022       No results found for: PSA    Images:   XR Abdomen KUB    Result Date: 4/11/2022  Appearance concerning for a large left ureteral stone measuring just over 4 mm adjacent to the left L4 transverse process.  This report was finalized on 4/11/2022 11:26 AM by Dr. Jeffy Carr MD.      [place] [facility]    Result Date: 4/14/2022  As above.  This report was finalized on 4/14/2022 1:08 PM by Dr. Abiel Ortiz MD.        Pathology:      Measures:   Tobacco:   Amaury Mendoza  reports that he has never smoked. He has never used smokeless tobacco.    Urine Incontinence: Patient reports that he is not currently experiencing any symptoms of urinary incontinence.        Assessment / Plan      Assessment/Plan:   42 y.o. male who presented today for follow up of his left ureteral stone.  He is doing much better.  No flank pain or discomfort.  He has done well since removing his stent.  I will have him follow up in 6 months.      Diagnoses and all orders for this visit:    1. Frequency of urination (Primary)  -     POC Urinalysis Dipstick, Automated           Follow Up:   Return in about 6 months (around 12/6/2022).    I spent approximately 30 minutes providing clinical care for this patient; including review of patient's chart and provider documentation, face to face time spent with patient in examination room (obtaining history, performing physical exam, discussing diagnosis and management options), placing orders, and completing patient documentation.     Tariq Riley MD  [facility]

## 2022-11-11 ENCOUNTER — APPOINTMENT (OUTPATIENT)
Dept: GENERAL RADIOLOGY | Facility: HOSPITAL | Age: 42
End: 2022-11-11

## 2022-11-11 ENCOUNTER — HOSPITAL ENCOUNTER (EMERGENCY)
Facility: HOSPITAL | Age: 42
Discharge: HOME | End: 2022-11-11
Attending: STUDENT IN AN ORGANIZED HEALTH CARE EDUCATION/TRAINING PROGRAM | Admitting: STUDENT IN AN ORGANIZED HEALTH CARE EDUCATION/TRAINING PROGRAM

## 2022-11-11 VITALS
HEIGHT: 66 IN | OXYGEN SATURATION: 95 % | WEIGHT: 180 LBS | DIASTOLIC BLOOD PRESSURE: 68 MMHG | SYSTOLIC BLOOD PRESSURE: 116 MMHG | BODY MASS INDEX: 28.93 KG/M2 | RESPIRATION RATE: 20 BRPM | TEMPERATURE: 98.4 F | HEART RATE: 79 BPM

## 2022-11-11 DIAGNOSIS — S20.212A CONTUSION OF RIB ON LEFT SIDE, INITIAL ENCOUNTER: ICD-10-CM
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
## 2022-11-11 PROCEDURE — 99282 EMERGENCY DEPT VISIT SF MDM: CPT

## 2022-11-11 PROCEDURE — 71101 X-RAY EXAM UNILAT RIBS/CHEST: CPT

## 2022-11-11 PROCEDURE — 71101 X-RAY EXAM UNILAT RIBS/CHEST: CPT | Performed by: RADIOLOGY
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
## 2024-06-17 ENCOUNTER — TELEPHONE (OUTPATIENT)
Dept: UROLOGY | Facility: CLINIC | Age: 44
End: 2024-06-17

## 2024-06-17 NOTE — TELEPHONE ENCOUNTER
Caller: Amaury Mendoza     Relationship to patient: SELF    Best call back number: [phone]    Chief complaint: UTI    Type of visit: NEW PT    Requested date: ASAP    If rescheduling, when is the original appointment: 06/24/24     Additional notes: PT LAST SEEN NEY OVER 3 YEARS AGO. HE RECENTLY PASSED A SMALL STONE AND BELIEVES IT HAS CAUSED A UTI. [facility] WAS ABLE TO SCHEDULE FOR 06/24/24 HOWEVER HE IS ASKING IF THERE IS ANYWAY POSSIBLE TO BE SEEN THIS WEEK AS HE HAS BURNING 20 TO 30 MINS AFTER URINATING.      PLEASE CONTACT PT WITH SOONER APPT THANK YOU

## 2024-06-19 ENCOUNTER — OFFICE VISIT (OUTPATIENT)
Dept: UROLOGY | Facility: CLINIC | Age: 44
End: 2024-06-19
Payer: COMMERCIAL

## 2024-06-19 VITALS
HEART RATE: 85 BPM | DIASTOLIC BLOOD PRESSURE: 84 MMHG | WEIGHT: 194.6 LBS | BODY MASS INDEX: 31.27 KG/M2 | SYSTOLIC BLOOD PRESSURE: 124 MMHG | HEIGHT: 66 IN

## 2024-06-19 DIAGNOSIS — R35.0 FREQUENCY OF MICTURITION: ICD-10-CM

## 2024-06-19 DIAGNOSIS — N20.0 BILATERAL NEPHROLITHIASIS: ICD-10-CM

## 2024-06-19 DIAGNOSIS — R10.9 FLANK PAIN: Primary | ICD-10-CM

## 2024-06-19 DIAGNOSIS — R10.9 FLANK PAIN: ICD-10-CM

## 2024-06-19 DIAGNOSIS — R30.0 DYSURIA: ICD-10-CM

## 2024-06-19 LAB
BILIRUB BLD-MCNC: NEGATIVE MG/DL
CLARITY, POC: CLEAR
COLOR UR: ABNORMAL
EXPIRATION DATE: ABNORMAL
GLUCOSE UR STRIP-MCNC: NEGATIVE MG/DL
KETONES UR QL: NEGATIVE
LEUKOCYTE EST, POC: NEGATIVE
Lab: ABNORMAL
NITRITE UR-MCNC: NEGATIVE MG/ML
PH UR: 6 [PH] (ref 5–8)
PROT UR STRIP-MCNC: ABNORMAL MG/DL
RBC # UR STRIP: ABNORMAL /UL
SP GR UR: 1.02 (ref 1–1.03)
UROBILINOGEN UR QL: NORMAL

## 2024-06-19 PROCEDURE — 87086 URINE CULTURE/COLONY COUNT: CPT

## 2024-06-19 RX ORDER — KETOROLAC TROMETHAMINE 10 MG/1
10 TABLET, FILM COATED ORAL EVERY 6 HOURS PRN
Qty: 16 TABLET | Refills: 0 | Status: SHIPPED | OUTPATIENT
Start: 2024-06-19

## 2024-06-19 RX ORDER — TAMSULOSIN HYDROCHLORIDE 0.4 MG/1
1 CAPSULE ORAL DAILY
Qty: 30 CAPSULE | Refills: 1 | Status: SHIPPED | OUTPATIENT
Start: 2024-06-19 | End: 2024-06-19

## 2024-06-19 RX ORDER — DOXYCYCLINE HYCLATE 100 MG/1
100 CAPSULE ORAL EVERY 12 HOURS
Qty: 10 CAPSULE | Refills: 0 | Status: SHIPPED | OUTPATIENT
Start: 2024-06-19 | End: 2024-06-24

## 2024-06-19 RX ORDER — TAMSULOSIN HYDROCHLORIDE 0.4 MG/1
1 CAPSULE ORAL DAILY
Qty: 90 CAPSULE | Refills: 3 | Status: SHIPPED | OUTPATIENT
Start: 2024-06-19

## 2024-06-19 NOTE — PROGRESS NOTES
"Chief Complaint:    Chief Complaint   Patient presents with    Urinary Tract Infection       Vital Signs:   /84   Pulse 85   Ht 167.6 cm (65.98\")   Wt 88.3 kg (194 lb 9.6 oz)   BMI 31.42 kg/m²   Body mass index is 31.42 kg/m².      HPI:  Amaury Mendoza is a 44 y.o. male who presents today for follow up    History of Present Illness  Mr. Mendoza presents to the clinic for evaluation of urinary tract infection.  He has a past medical history significant for nephrolithiasis and was previously seen in 2022 by Griselda Cheng-Akwa, APRN and Dr. Yen.  He underwent a left uteroscopy with holmium laser lithotripsy and stent insertion at that time for stone removal.  Patient has not had any kidney stones since surgery removal.  Patient reports that he did have some intermittent left-sided back and flank pain over the past few weeks.  He reports passing a small kidney stone roughly 1 week ago and has had persistent dysuria since.  States he still has some intermittent left-sided back and flank pain and feels he may be passing another kidney stone.  He does endorse dehydration recently.  Did discuss the use of x-ray in office today however patient declined.  Urine analysis showed trace amount of protein and trace amount of blood only with no signs of leukocytes or glucose.      Past Medical History:  Past Medical History:   Diagnosis Date    Kidney stones        Current Meds:  Current Outpatient Medications   Medication Sig Dispense Refill    ketorolac (TORADOL) 10 MG tablet Take 1 tablet by mouth Every 6 (Six) Hours As Needed for Moderate Pain. 16 tablet 0    doxycycline (VIBRAMYCIN) 100 MG capsule Take 1 capsule by mouth Every 12 (Twelve) Hours for 5 days. 10 capsule 0    tamsulosin (FLOMAX) 0.4 MG capsule 24 hr capsule TAKE 1 CAPSULE BY MOUTH DAILY 90 capsule 3     No current facility-administered medications for this visit.        Allergies:   No Known Allergies     Past Surgical History:  Past Surgical History: "   Procedure Laterality Date    APPENDECTOMY      CYSTOSCOPY URETEROSCOPY LASER LITHOTRIPSY Left 4/14/2022    Procedure: CYSTOSCOPY URETEROSCOPY LASER LITHOTRIPSY WITH  LEFT STENT;  Surgeon: Tariq Riley MD;  Location: [facility];  Service: Urology;  Laterality: Left;       Social History:  Social History     Socioeconomic History    Marital status:    Tobacco Use    Smoking status: Never     Passive exposure: Never    Smokeless tobacco: Never   Vaping Use    Vaping status: Never Used   Substance and Sexual Activity    Alcohol use: Not Currently    Drug use: Never    Sexual activity: Defer       Family History:  Family History   Problem Relation Age of Onset    No Known Problems Father     No Known Problems Mother        Review of Systems:  Review of Systems   Constitutional:  Negative for chills, fatigue, fever and unexpected weight change.   HENT:  Negative for congestion, rhinorrhea and sore throat.    Eyes:  Negative for visual disturbance.   Respiratory:  Negative for apnea, cough, chest tightness and shortness of breath.    Cardiovascular:  Negative for chest pain.   Gastrointestinal:  Negative for abdominal pain, constipation, diarrhea, nausea and vomiting.   Endocrine: Negative for polydipsia and polyuria.   Genitourinary:  Positive for dysuria, frequency and hematuria. Negative for difficulty urinating, enuresis, flank pain, genital sores and urgency.   Musculoskeletal:  Negative for gait problem.   Skin:  Negative for rash and wound.   Allergic/Immunologic: Negative for immunocompromised state.   Neurological:  Negative for dizziness, tremors, syncope, weakness and light-headedness.   Hematological:  Does not bruise/bleed easily.   Psychiatric/Behavioral:  Negative for confusion and suicidal ideas.        Physical Exam:  Physical Exam  Constitutional:       General: He is not in acute distress.     Appearance: Normal appearance.   HENT:      Head: Normocephalic and atraumatic.      Nose: Nose  normal.      Mouth/Throat:      Mouth: Mucous membranes are moist.   Eyes:      Conjunctiva/sclera: Conjunctivae normal.   Cardiovascular:      Rate and Rhythm: Normal rate and regular rhythm.      Pulses: Normal pulses.      Heart sounds: Normal heart sounds.   Pulmonary:      Effort: Pulmonary effort is normal.      Breath sounds: Normal breath sounds.   Abdominal:      General: Bowel sounds are normal.      Palpations: Abdomen is soft.      Tenderness: There is no right CVA tenderness or left CVA tenderness.   Musculoskeletal:         General: Normal range of motion.      Cervical back: Normal range of motion.   Skin:     General: Skin is warm.   Neurological:      General: No focal deficit present.      Mental Status: He is alert and oriented to person, place, and time.   Psychiatric:         Mood and Affect: Mood normal.         Behavior: Behavior normal.         Thought Content: Thought content normal.         Judgment: Judgment normal.           Recent Image (CT and/or KUB):   CT Abdomen and Pelvis: No results found for this or any previous visit.     CT Stone Protocol: No results found for this or any previous visit.     KUB: Results for orders placed in visit on 04/11/22    XR Abdomen KUB    Narrative  XR ABDOMEN KUB-    CLINICAL INDICATION: bilateral flank pain; N20.0-Calculus of kidney;  R10.9-Unspecified abdominal pain; K59.04-Chronic idiopathic constipation    COMPARISON: 04/01/2021    FINDINGS: Two views of the abdomen show a calcification just lateral to  the left L4 transverse process most likely representing a large left  ureteral stone.    Calcifications are seen projecting over the left kidney as well.    Impression  Appearance concerning for a large left ureteral stone  measuring just over 4 mm adjacent to the left L4 transverse process.    This report was finalized on 4/11/2022 11:26 AM by Dr. Jeffy Carr MD.       Labs:  Brief Urine Lab Results  (Last result in the past 365 days)        Color    Clarity   Blood   Leuk Est   Nitrite   Protein   CREAT   Urine HCG        06/19/24 1414 Cici   Clear   Trace   Negative   Negative   Trace                 Office Visit on 06/19/2024   Component Date Value Ref Range Status    Color 06/19/2024 Cici  Yellow, Straw, Dark Yellow, Cici Final    Clarity, UA 06/19/2024 Clear  Clear Final    Specific Gravity  06/19/2024 1.025  1.005 - 1.030 Final    pH, Urine 06/19/2024 6.0  5.0 - 8.0 Final    Leukocytes 06/19/2024 Negative  Negative Final    Nitrite, UA 06/19/2024 Negative  Negative Final    Protein, POC 06/19/2024 Trace (A)  Negative mg/dL Final    Glucose, UA 06/19/2024 Negative  Negative mg/dL Final    Ketones, UA 06/19/2024 Negative  Negative Final    Urobilinogen, UA 06/19/2024 Normal  Normal, 0.2 E.U./dL Final    Bilirubin 06/19/2024 Negative  Negative Final    Blood, UA 06/19/2024 Trace (A)  Negative Final    Lot Number 06/19/2024 [id number]   Final    Expiration Date 06/19/2024 10/25/24   Final        Procedure: None  Procedures     I have reviewed and agree with the above PMH, PSH, FMH, social history, medications, allergies, and labs.     Assessment/Plan:   Problem List Items Addressed This Visit       Bilateral flank pain - Primary    Overview     Added automatically from request for surgery [id number]         Relevant Medications    ketorolac (TORADOL) 10 MG tablet     Other Visit Diagnoses       Frequency of micturition        Relevant Orders    POC Urinalysis Dipstick, Automated (Completed)    Urine Culture - Urine, Urine, Random Void    Bilateral nephrolithiasis        Dysuria        Relevant Medications    doxycycline (VIBRAMYCIN) 100 MG capsule            Health Maintenance:   Health Maintenance Due   Topic Date Due    BMI FOLLOWUP  Never done    HEPATITIS C SCREENING  Never done    ANNUAL PHYSICAL  Never done    COVID-19 Vaccine (1 - 2023-24 season) Never done        Smoking Counseling: Never smoked.  Never used smokeless tobacco.  Counseling  given.    Urine Incontinence: Patient reports that he is not currently experiencing any symptoms of urinary incontinence.    Patient was given instructions and counseling regarding his condition or for health maintenance advice. Please see specific information pulled into the AVS if appropriate.    Patient Education:   Bilateral kidney stone/flank pain -discussed with the patient the pathophysiology of these conditions in detail.  Did discuss the use of appropriate workup including a KUB versus CT scan for evaluation of any nephrolithiasis/obstructive uropathy.  Discussed the risk and benefits of these scans in detail including radiation exposure.  Patient wishes to hold off on any forms of imaging at this time.  Given patient's current pain level of an 8 out of 10 I will send in Toradol 10 mg tablets for him to take as needed for moderate to severe pain.  Discussed the risk and benefits of these medications in detail and advised him not to take concurrently with other nonsteroidal anti-inflammatory medications.  Advised him to increase water intake 2 to 3 L/day.  I will also send in Flomax for the patient to take as needed daily to help with passage of kidney stones.  Patient verbalized understanding.  Dysuria -given patient's continuation of dysuria since passage of kidney stone we will start him on doxycycline 100 mg once every 12 hours for 5 days.  I will send the patient's urine off for culture and call with results once available.  Otherwise we will place him back in 1 month for reevaluation.  Vies him return to clinic sooner if needed.  He verbalized understanding.    Visit Diagnoses:    ICD-10-CM ICD-9-CM   1. Flank pain  R10.9 789.09   2. Frequency of micturition  R35.0 788.41   3. Bilateral nephrolithiasis  N20.0 592.0   4. Dysuria  R30.0 788.1       Meds Ordered During Visit:  New Medications Ordered This Visit   Medications    ketorolac (TORADOL) 10 MG tablet     Sig: Take 1 tablet by mouth Every 6 (Six)  Hours As Needed for Moderate Pain.     Dispense:  16 tablet     Refill:  0    doxycycline (VIBRAMYCIN) 100 MG capsule     Sig: Take 1 capsule by mouth Every 12 (Twelve) Hours for 5 days.     Dispense:  10 capsule     Refill:  0       Follow Up Appointment: 1 month  No follow-ups on file.      This document has been electronically signed by Caleb Lamb PA-C   June 19, 2024 16:15 EDT    Part of this note may be an electronic transcription/translation of spoken language to printed text using the Dragon Dictation System.

## 2024-06-21 ENCOUNTER — TELEPHONE (OUTPATIENT)
Dept: UROLOGY | Facility: CLINIC | Age: 44
End: 2024-06-21
Payer: COMMERCIAL

## 2024-06-21 LAB — BACTERIA SPEC AEROBE CULT: NO GROWTH

## (undated) DEVICE — COR CYSTO: Brand: MEDLINE INDUSTRIES, INC.

## (undated) DEVICE — GLW STD STR 3CM .035X150CM

## (undated) DEVICE — GLV SURG PREMIERPRO MIC LTX PF SZ7 BRN